# Patient Record
Sex: MALE | Race: WHITE | NOT HISPANIC OR LATINO | URBAN - METROPOLITAN AREA
[De-identification: names, ages, dates, MRNs, and addresses within clinical notes are randomized per-mention and may not be internally consistent; named-entity substitution may affect disease eponyms.]

---

## 2023-01-25 PROBLEM — R74.8 ELEVATED LIVER ENZYMES: Status: ACTIVE | Noted: 2023-01-25

## 2023-01-25 PROBLEM — I20.9 ANGINA, CLASS I (CMS-HCC): Status: ACTIVE | Noted: 2023-01-25

## 2023-01-25 PROBLEM — F10.27: Status: ACTIVE | Noted: 2023-01-25

## 2023-01-25 PROBLEM — I25.10 CORONARY ARTERY STENOSIS: Status: ACTIVE | Noted: 2023-01-25

## 2023-01-25 PROBLEM — R94.6 ABNORMAL THYROID FUNCTION TEST: Status: ACTIVE | Noted: 2023-01-25

## 2023-01-25 PROBLEM — D64.9 ANEMIA: Status: ACTIVE | Noted: 2023-01-25

## 2023-01-25 PROBLEM — R79.89 ABNORMAL LFTS: Status: ACTIVE | Noted: 2023-01-25

## 2023-01-25 PROBLEM — D72.829 ELEVATED WBC COUNT: Status: ACTIVE | Noted: 2023-01-25

## 2023-01-25 PROBLEM — G31.2 ALCOHOLIC ENCEPHALOPATHY (CMS-HCC): Status: ACTIVE | Noted: 2023-01-25

## 2023-01-25 PROBLEM — N18.31 STAGE 3A CHRONIC KIDNEY DISEASE (MULTI): Status: ACTIVE | Noted: 2023-01-25

## 2023-01-25 PROBLEM — I21.9 MYOCARDIAL INFARCTION (MULTI): Status: ACTIVE | Noted: 2023-01-25

## 2023-01-25 PROBLEM — E55.9 VITAMIN D DEFICIENCY: Status: ACTIVE | Noted: 2023-01-25

## 2023-01-25 PROBLEM — F41.9 ANXIETY: Status: ACTIVE | Noted: 2023-01-25

## 2023-01-25 PROBLEM — R73.01 IFG (IMPAIRED FASTING GLUCOSE): Status: ACTIVE | Noted: 2023-01-25

## 2023-01-25 PROBLEM — R76.8 ANA POSITIVE: Status: ACTIVE | Noted: 2023-01-25

## 2023-01-25 PROBLEM — I51.89 DIASTOLIC DYSFUNCTION: Status: ACTIVE | Noted: 2023-01-25

## 2023-01-25 PROBLEM — R31.29 OTHER MICROSCOPIC HEMATURIA: Status: ACTIVE | Noted: 2023-01-25

## 2023-01-25 PROBLEM — E78.5 HYPERLIPIDEMIA: Status: ACTIVE | Noted: 2023-01-25

## 2023-01-25 PROBLEM — R63.4 RAPID WEIGHT LOSS: Status: ACTIVE | Noted: 2023-01-25

## 2023-01-25 PROBLEM — K21.9 GERD (GASTROESOPHAGEAL REFLUX DISEASE): Status: ACTIVE | Noted: 2023-01-25

## 2023-01-25 PROBLEM — K52.9 GASTROENTERITIS: Status: ACTIVE | Noted: 2023-01-25

## 2023-01-25 PROBLEM — F17.210 CIGARETTE SMOKER: Status: ACTIVE | Noted: 2023-01-25

## 2023-01-25 PROBLEM — R60.9 EDEMA: Status: ACTIVE | Noted: 2023-01-25

## 2023-01-25 PROBLEM — F32.9 DEPRESSION, MAJOR: Status: ACTIVE | Noted: 2023-01-25

## 2023-01-25 PROBLEM — J20.9 ACUTE BRONCHITIS: Status: ACTIVE | Noted: 2023-01-25

## 2023-01-25 PROBLEM — S72.001A CLOSED FRACTURE OF RIGHT HIP (MULTI): Status: ACTIVE | Noted: 2023-01-25

## 2023-01-25 PROBLEM — F33.41 RECURRENT MAJOR DEPRESSIVE DISORDER, IN PARTIAL REMISSION (CMS-HCC): Status: ACTIVE | Noted: 2023-01-25

## 2023-01-25 PROBLEM — I30.9 PERICARDITIS, ACUTE (HHS-HCC): Status: ACTIVE | Noted: 2023-01-25

## 2023-01-25 PROBLEM — I10 HYPERTENSION: Status: ACTIVE | Noted: 2023-01-25

## 2023-01-25 PROBLEM — F10.20 ALCOHOL DEPENDENCY (MULTI): Status: ACTIVE | Noted: 2023-01-25

## 2023-01-25 PROBLEM — E87.1 HYPONATREMIA: Status: ACTIVE | Noted: 2023-01-25

## 2023-01-25 PROBLEM — J95.811 PNEUMOTHORAX, IATROGENIC: Status: ACTIVE | Noted: 2023-01-25

## 2023-01-25 PROBLEM — M62.838 NIGHT MUSCLE SPASMS: Status: ACTIVE | Noted: 2023-01-25

## 2023-01-25 PROBLEM — E83.52 HYPERCALCEMIA: Status: ACTIVE | Noted: 2023-01-25

## 2023-01-25 RX ORDER — HYDROXYZINE HYDROCHLORIDE 25 MG/1
1-2 TABLET, FILM COATED ORAL DAILY PRN
COMMUNITY

## 2023-01-25 RX ORDER — ASPIRIN 81 MG/1
1 TABLET ORAL DAILY
COMMUNITY

## 2023-01-25 RX ORDER — ATORVASTATIN CALCIUM 40 MG/1
1.5 TABLET, FILM COATED ORAL
COMMUNITY
Start: 2014-07-31 | End: 2023-04-03 | Stop reason: SDUPTHER

## 2023-03-07 PROBLEM — F32.9 DEPRESSION, MAJOR: Status: RESOLVED | Noted: 2023-01-25 | Resolved: 2023-03-07

## 2023-03-07 PROBLEM — J95.811 PNEUMOTHORAX, IATROGENIC: Status: RESOLVED | Noted: 2023-01-25 | Resolved: 2023-03-07

## 2023-03-07 PROBLEM — R76.8 ANA POSITIVE: Status: RESOLVED | Noted: 2023-01-25 | Resolved: 2023-03-07

## 2023-03-07 PROBLEM — J20.9 ACUTE BRONCHITIS: Status: RESOLVED | Noted: 2023-01-25 | Resolved: 2023-03-07

## 2023-03-07 PROBLEM — E83.52 HYPERCALCEMIA: Status: RESOLVED | Noted: 2023-01-25 | Resolved: 2023-03-07

## 2023-03-07 PROBLEM — I30.9 PERICARDITIS, ACUTE (HHS-HCC): Status: RESOLVED | Noted: 2023-01-25 | Resolved: 2023-03-07

## 2023-03-07 PROBLEM — K52.9 GASTROENTERITIS: Status: RESOLVED | Noted: 2023-01-25 | Resolved: 2023-03-07

## 2023-03-07 PROBLEM — E87.1 HYPONATREMIA: Status: RESOLVED | Noted: 2023-01-25 | Resolved: 2023-03-07

## 2023-03-15 DIAGNOSIS — F51.01 PRIMARY INSOMNIA: Primary | ICD-10-CM

## 2023-03-16 RX ORDER — TRAZODONE HYDROCHLORIDE 50 MG/1
TABLET ORAL
Qty: 30 TABLET | Refills: 0 | Status: SHIPPED | OUTPATIENT
Start: 2023-03-16 | End: 2023-04-03 | Stop reason: SDUPTHER

## 2023-04-03 ENCOUNTER — LAB (OUTPATIENT)
Dept: LAB | Facility: LAB | Age: 63
End: 2023-04-03
Payer: COMMERCIAL

## 2023-04-03 ENCOUNTER — OFFICE VISIT (OUTPATIENT)
Dept: PRIMARY CARE | Facility: CLINIC | Age: 63
End: 2023-04-03
Payer: COMMERCIAL

## 2023-04-03 VITALS
WEIGHT: 149 LBS | BODY MASS INDEX: 20.86 KG/M2 | DIASTOLIC BLOOD PRESSURE: 74 MMHG | SYSTOLIC BLOOD PRESSURE: 126 MMHG | HEART RATE: 102 BPM | RESPIRATION RATE: 16 BRPM | HEIGHT: 71 IN | OXYGEN SATURATION: 99 %

## 2023-04-03 DIAGNOSIS — I10 PRIMARY HYPERTENSION: ICD-10-CM

## 2023-04-03 DIAGNOSIS — F33.41 RECURRENT MAJOR DEPRESSIVE DISORDER, IN PARTIAL REMISSION (CMS-HCC): ICD-10-CM

## 2023-04-03 DIAGNOSIS — I21.9 MYOCARDIAL INFARCTION, UNSPECIFIED MI TYPE, UNSPECIFIED ARTERY (MULTI): ICD-10-CM

## 2023-04-03 DIAGNOSIS — N18.31 STAGE 3A CHRONIC KIDNEY DISEASE (MULTI): ICD-10-CM

## 2023-04-03 DIAGNOSIS — F41.9 ANXIETY: ICD-10-CM

## 2023-04-03 DIAGNOSIS — F10.26: ICD-10-CM

## 2023-04-03 DIAGNOSIS — F51.01 PRIMARY INSOMNIA: ICD-10-CM

## 2023-04-03 DIAGNOSIS — F17.210 CIGARETTE SMOKER: ICD-10-CM

## 2023-04-03 DIAGNOSIS — E78.1 PURE HYPERTRIGLYCERIDEMIA: ICD-10-CM

## 2023-04-03 DIAGNOSIS — R73.01 IFG (IMPAIRED FASTING GLUCOSE): ICD-10-CM

## 2023-04-03 DIAGNOSIS — F10.27 DEMENTIA ASSOCIATED WITH ALCOHOLISM WITH BEHAVIORAL DISTURBANCE (MULTI): Primary | ICD-10-CM

## 2023-04-03 DIAGNOSIS — D72.829 LEUKOCYTOSIS, UNSPECIFIED TYPE: ICD-10-CM

## 2023-04-03 DIAGNOSIS — I25.10 CORONARY ARTERY STENOSIS: ICD-10-CM

## 2023-04-03 PROBLEM — S72.001A CLOSED FRACTURE OF RIGHT HIP (MULTI): Status: RESOLVED | Noted: 2023-01-25 | Resolved: 2023-04-03

## 2023-04-03 PROBLEM — K21.9 GERD (GASTROESOPHAGEAL REFLUX DISEASE): Status: RESOLVED | Noted: 2023-01-25 | Resolved: 2023-04-03

## 2023-04-03 PROBLEM — R79.89 ABNORMAL LFTS: Status: RESOLVED | Noted: 2023-01-25 | Resolved: 2023-04-03

## 2023-04-03 LAB
BASOPHILS (10*3/UL) IN BLOOD BY AUTOMATED COUNT: 0.07 X10E9/L (ref 0–0.1)
BASOPHILS/100 LEUKOCYTES IN BLOOD BY AUTOMATED COUNT: 0.9 % (ref 0–2)
EOSINOPHILS (10*3/UL) IN BLOOD BY AUTOMATED COUNT: 0.22 X10E9/L (ref 0–0.7)
EOSINOPHILS/100 LEUKOCYTES IN BLOOD BY AUTOMATED COUNT: 2.9 % (ref 0–6)
ERYTHROCYTE DISTRIBUTION WIDTH (RATIO) BY AUTOMATED COUNT: 14.8 % (ref 11.5–14.5)
ERYTHROCYTE MEAN CORPUSCULAR HEMOGLOBIN CONCENTRATION (G/DL) BY AUTOMATED: 31.4 G/DL (ref 32–36)
ERYTHROCYTE MEAN CORPUSCULAR VOLUME (FL) BY AUTOMATED COUNT: 94 FL (ref 80–100)
ERYTHROCYTES (10*6/UL) IN BLOOD BY AUTOMATED COUNT: 4.81 X10E12/L (ref 4.5–5.9)
HEMATOCRIT (%) IN BLOOD BY AUTOMATED COUNT: 45.2 % (ref 41–52)
HEMOGLOBIN (G/DL) IN BLOOD: 14.2 G/DL (ref 13.5–17.5)
IMMATURE GRANULOCYTES/100 LEUKOCYTES IN BLOOD BY AUTOMATED COUNT: 0.7 % (ref 0–0.9)
LEUKOCYTES (10*3/UL) IN BLOOD BY AUTOMATED COUNT: 7.5 X10E9/L (ref 4.4–11.3)
LYMPHOCYTES (10*3/UL) IN BLOOD BY AUTOMATED COUNT: 2.13 X10E9/L (ref 1.2–4.8)
LYMPHOCYTES/100 LEUKOCYTES IN BLOOD BY AUTOMATED COUNT: 28.4 % (ref 13–44)
MONOCYTES (10*3/UL) IN BLOOD BY AUTOMATED COUNT: 0.49 X10E9/L (ref 0.1–1)
MONOCYTES/100 LEUKOCYTES IN BLOOD BY AUTOMATED COUNT: 6.5 % (ref 2–10)
NEUTROPHILS (10*3/UL) IN BLOOD BY AUTOMATED COUNT: 4.55 X10E9/L (ref 1.2–7.7)
NEUTROPHILS/100 LEUKOCYTES IN BLOOD BY AUTOMATED COUNT: 60.6 % (ref 40–80)
NRBC (PER 100 WBCS) BY AUTOMATED COUNT: 0 /100 WBC (ref 0–0)
PLATELETS (10*3/UL) IN BLOOD AUTOMATED COUNT: 253 X10E9/L (ref 150–450)

## 2023-04-03 PROCEDURE — 99214 OFFICE O/P EST MOD 30 MIN: CPT | Performed by: FAMILY MEDICINE

## 2023-04-03 PROCEDURE — 85025 COMPLETE CBC W/AUTO DIFF WBC: CPT

## 2023-04-03 PROCEDURE — 3074F SYST BP LT 130 MM HG: CPT | Performed by: FAMILY MEDICINE

## 2023-04-03 PROCEDURE — 3078F DIAST BP <80 MM HG: CPT | Performed by: FAMILY MEDICINE

## 2023-04-03 PROCEDURE — 36415 COLL VENOUS BLD VENIPUNCTURE: CPT

## 2023-04-03 PROCEDURE — 80053 COMPREHEN METABOLIC PANEL: CPT

## 2023-04-03 RX ORDER — TRAZODONE HYDROCHLORIDE 50 MG/1
TABLET ORAL
Qty: 90 TABLET | Refills: 1 | Status: SHIPPED | OUTPATIENT
Start: 2023-04-03 | End: 2023-12-14 | Stop reason: SDUPTHER

## 2023-04-03 RX ORDER — ATORVASTATIN CALCIUM 40 MG/1
60 TABLET, FILM COATED ORAL
Qty: 90 TABLET | Refills: 1 | Status: SHIPPED | OUTPATIENT
Start: 2023-04-03 | End: 2023-07-10 | Stop reason: SDUPTHER

## 2023-04-03 ASSESSMENT — ENCOUNTER SYMPTOMS
DIFFICULTY URINATING: 0
EYE PAIN: 0
FEVER: 0
HEADACHES: 0
SORE THROAT: 0
ARTHRALGIAS: 0
NERVOUS/ANXIOUS: 0
CONSTIPATION: 0
ABDOMINAL PAIN: 0
BRUISES/BLEEDS EASILY: 0
ADENOPATHY: 0
BACK PAIN: 0
CHEST TIGHTNESS: 0
BLOOD IN STOOL: 0
DIARRHEA: 0
DIZZINESS: 0
COUGH: 0
WEAKNESS: 0
ABDOMINAL DISTENTION: 0
APPETITE CHANGE: 0
SHORTNESS OF BREATH: 0
HALLUCINATIONS: 0
EYE REDNESS: 0
CHILLS: 0
DYSPHORIC MOOD: 0
DYSURIA: 0
FATIGUE: 0

## 2023-04-03 NOTE — PROGRESS NOTES
"Subjective   Patient ID: Saturnino Cooper is a 62 y.o. male who presents for Follow-up.    HPI     Review of Systems    Objective   BP (!) 158/96   Pulse 102   Resp 16   Ht 1.803 m (5' 11\")   Wt 67.6 kg (149 lb)   SpO2 99%   BMI 20.78 kg/m²     Physical Exam    Assessment/Plan          "

## 2023-04-03 NOTE — PROGRESS NOTES
"Subjective   Patient ID: Saturnino Cooper is a 62 y.o. male who presents for Follow-up.  Pt has chronic CAD, DLD. Saw Dr Castanon.  Pt is taking ASA, Atorvastatin. Tolerating well.  Exercising 0 days per week   Low sodium diet is not being followed.   Is  monitoring home blood pressures.. Usually good with occasional high.  Denies HA, vision changes or CP.     Dementia / anxiety are stable. He still needs to get appt with psychiatry. Doing much better with anxiety. Getting better sleep 6-8hrs nightly. Unsure if using Hydroxyzine often (brother, Giorgi admins meds and not here today).     He is smoking 1/2 ppd, not ready to quit smoking.  He declines CT for lung cancer screening.           Review of Systems   Constitutional:  Negative for appetite change, chills, fatigue and fever.   HENT:  Negative for congestion, hearing loss and sore throat.    Eyes:  Negative for pain, redness and visual disturbance.   Respiratory:  Negative for cough, chest tightness and shortness of breath.    Cardiovascular:  Negative for chest pain and leg swelling.   Gastrointestinal:  Negative for abdominal distention, abdominal pain, blood in stool, constipation and diarrhea.   Genitourinary:  Negative for difficulty urinating and dysuria.   Musculoskeletal:  Negative for arthralgias and back pain.   Skin:  Negative for rash.   Neurological:  Negative for dizziness, weakness and headaches.   Hematological:  Negative for adenopathy. Does not bruise/bleed easily.   Psychiatric/Behavioral:  Negative for dysphoric mood and hallucinations. The patient is not nervous/anxious.        Objective   /74   Pulse 102   Resp 16   Ht 1.803 m (5' 11\")   Wt 67.6 kg (149 lb)   SpO2 99%   BMI 20.78 kg/m²    Physical Exam  Constitutional:       General: He is not in acute distress.     Appearance: Normal appearance.   Cardiovascular:      Rate and Rhythm: Normal rate and regular rhythm.      Heart sounds: Normal heart sounds. No murmur " heard.  Pulmonary:      Effort: Pulmonary effort is normal.      Breath sounds: Normal breath sounds.   Abdominal:      Palpations: Abdomen is soft.      Tenderness: There is no abdominal tenderness.   Neurological:      Mental Status: He is alert.   Psychiatric:         Mood and Affect: Mood normal.         Judgment: Judgment normal.           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   -     CBC and Auto Differential; Future  -     Comprehensive Metabolic Panel; Future  Myocardial infarction, unspecified MI type, unspecified artery (CMS/HCC)  Stage 3a chronic kidney disease - improved on previous labs, will rechec  IFG (impaired fasting glucose) - Will monitor with labs  Leukocytosis, unspecified type  Alcohol dependence with alcohol-induced persisting amnestic  disorder (CMS/HCC) - doing well reportedly not drinking in months  Anxiety - improved, sleeping better. Continue current meds  Cigarette smoker - Strongly recommend smoking cessation and discussed nicotine replacement and medications to help with quitting smoking.,  Pure hypertriglyceridemia  Recurrent major depressive disorder, in partial remission (CMS/HCC) -continue current meds, plan to est with psychiatry

## 2023-04-04 ENCOUNTER — TELEPHONE (OUTPATIENT)
Dept: PRIMARY CARE | Facility: CLINIC | Age: 63
End: 2023-04-04
Payer: COMMERCIAL

## 2023-04-04 LAB
ALANINE AMINOTRANSFERASE (SGPT) (U/L) IN SER/PLAS: 30 U/L (ref 10–52)
ALBUMIN (G/DL) IN SER/PLAS: 4.7 G/DL (ref 3.4–5)
ALKALINE PHOSPHATASE (U/L) IN SER/PLAS: 72 U/L (ref 33–136)
ANION GAP IN SER/PLAS: 16 MMOL/L (ref 10–20)
ASPARTATE AMINOTRANSFERASE (SGOT) (U/L) IN SER/PLAS: 30 U/L (ref 9–39)
BILIRUBIN TOTAL (MG/DL) IN SER/PLAS: 0.3 MG/DL (ref 0–1.2)
CALCIUM (MG/DL) IN SER/PLAS: 10.4 MG/DL (ref 8.6–10.6)
CARBON DIOXIDE, TOTAL (MMOL/L) IN SER/PLAS: 26 MMOL/L (ref 21–32)
CHLORIDE (MMOL/L) IN SER/PLAS: 103 MMOL/L (ref 98–107)
CREATININE (MG/DL) IN SER/PLAS: 1.29 MG/DL (ref 0.5–1.3)
GFR MALE: 62 ML/MIN/1.73M2
GLUCOSE (MG/DL) IN SER/PLAS: 124 MG/DL (ref 74–99)
POTASSIUM (MMOL/L) IN SER/PLAS: 5.1 MMOL/L (ref 3.5–5.3)
PROTEIN TOTAL: 7.4 G/DL (ref 6.4–8.2)
SODIUM (MMOL/L) IN SER/PLAS: 140 MMOL/L (ref 136–145)
UREA NITROGEN (MG/DL) IN SER/PLAS: 15 MG/DL (ref 6–23)

## 2023-04-04 NOTE — TELEPHONE ENCOUNTER
----- Message from Chente Hicks MD sent at 4/4/2023  7:27 AM EDT -----  FBS elevated in prediabetes range. Other labs look good.   ----- Message -----  From: Lab, Background User  Sent: 4/3/2023  11:59 PM EDT  To: Chente Hicks MD

## 2023-05-18 ENCOUNTER — TELEPHONE (OUTPATIENT)
Dept: PRIMARY CARE | Facility: CLINIC | Age: 63
End: 2023-05-18
Payer: COMMERCIAL

## 2023-05-18 NOTE — TELEPHONE ENCOUNTER
LVM for brother regarding HIPAA form questions. Previous on located in chart but needs to be updated. None seen from previous appt as mentioned by sister. Told pt that form will need to be updated and to contact office with any further questions.

## 2023-07-03 ENCOUNTER — APPOINTMENT (OUTPATIENT)
Dept: PRIMARY CARE | Facility: CLINIC | Age: 63
End: 2023-07-03
Payer: COMMERCIAL

## 2023-07-10 ENCOUNTER — OFFICE VISIT (OUTPATIENT)
Dept: PRIMARY CARE | Facility: CLINIC | Age: 63
End: 2023-07-10
Payer: COMMERCIAL

## 2023-07-10 VITALS
HEART RATE: 92 BPM | RESPIRATION RATE: 16 BRPM | OXYGEN SATURATION: 98 % | HEIGHT: 70 IN | SYSTOLIC BLOOD PRESSURE: 124 MMHG | DIASTOLIC BLOOD PRESSURE: 76 MMHG | WEIGHT: 136 LBS | BODY MASS INDEX: 19.47 KG/M2

## 2023-07-10 DIAGNOSIS — F33.41 RECURRENT MAJOR DEPRESSIVE DISORDER, IN PARTIAL REMISSION (CMS-HCC): ICD-10-CM

## 2023-07-10 DIAGNOSIS — I10 PRIMARY HYPERTENSION: ICD-10-CM

## 2023-07-10 DIAGNOSIS — E78.00 PURE HYPERCHOLESTEROLEMIA: ICD-10-CM

## 2023-07-10 DIAGNOSIS — Z95.1 S/P CABG (CORONARY ARTERY BYPASS GRAFT): ICD-10-CM

## 2023-07-10 DIAGNOSIS — I25.10 CORONARY ARTERY STENOSIS: Primary | ICD-10-CM

## 2023-07-10 DIAGNOSIS — E78.1 PURE HYPERTRIGLYCERIDEMIA: ICD-10-CM

## 2023-07-10 DIAGNOSIS — F17.210 CIGARETTE SMOKER: ICD-10-CM

## 2023-07-10 DIAGNOSIS — I51.89 DIASTOLIC DYSFUNCTION: ICD-10-CM

## 2023-07-10 DIAGNOSIS — R63.4 RAPID WEIGHT LOSS: ICD-10-CM

## 2023-07-10 DIAGNOSIS — F10.27 DEMENTIA ASSOCIATED WITH ALCOHOLISM WITH BEHAVIORAL DISTURBANCE (MULTI): ICD-10-CM

## 2023-07-10 DIAGNOSIS — R73.01 IFG (IMPAIRED FASTING GLUCOSE): ICD-10-CM

## 2023-07-10 PROBLEM — R48.8 OTHER SYMBOLIC DYSFUNCTIONS: Status: ACTIVE | Noted: 2021-07-15

## 2023-07-10 PROBLEM — E44.0 MALNUTRITION OF MODERATE DEGREE (MULTI): Status: ACTIVE | Noted: 2022-10-07

## 2023-07-10 PROBLEM — F10.11 H/O ETOH ABUSE: Status: ACTIVE | Noted: 2021-06-18

## 2023-07-10 PROBLEM — F10.20 ALCOHOL DEPENDENCY (MULTI): Status: RESOLVED | Noted: 2023-01-25 | Resolved: 2023-07-10

## 2023-07-10 PROBLEM — I21.9 MYOCARDIAL INFARCTION (MULTI): Status: RESOLVED | Noted: 2023-01-25 | Resolved: 2023-07-10

## 2023-07-10 PROBLEM — G31.2 ALCOHOLIC ENCEPHALOPATHY (CMS-HCC): Status: RESOLVED | Noted: 2023-01-25 | Resolved: 2023-07-10

## 2023-07-10 PROBLEM — M62.81 MUSCLE WEAKNESS: Status: ACTIVE | Noted: 2021-07-15

## 2023-07-10 PROBLEM — N18.31 STAGE 3A CHRONIC KIDNEY DISEASE (MULTI): Status: RESOLVED | Noted: 2023-01-25 | Resolved: 2023-07-10

## 2023-07-10 PROBLEM — R26.2 DIFFICULTY IN WALKING, NOT ELSEWHERE CLASSIFIED: Status: ACTIVE | Noted: 2021-07-15

## 2023-07-10 PROBLEM — R35.0 URINARY FREQUENCY: Status: ACTIVE | Noted: 2023-07-10

## 2023-07-10 PROBLEM — F03.90 UNSPECIFIED DEMENTIA, UNSPECIFIED SEVERITY, WITHOUT BEHAVIORAL DISTURBANCE, PSYCHOTIC DISTURBANCE, MOOD DISTURBANCE, AND ANXIETY (MULTI): Status: ACTIVE | Noted: 2021-07-15

## 2023-07-10 PROCEDURE — 99214 OFFICE O/P EST MOD 30 MIN: CPT | Performed by: FAMILY MEDICINE

## 2023-07-10 PROCEDURE — 4004F PT TOBACCO SCREEN RCVD TLK: CPT | Performed by: FAMILY MEDICINE

## 2023-07-10 PROCEDURE — 3074F SYST BP LT 130 MM HG: CPT | Performed by: FAMILY MEDICINE

## 2023-07-10 PROCEDURE — 3078F DIAST BP <80 MM HG: CPT | Performed by: FAMILY MEDICINE

## 2023-07-10 RX ORDER — LISINOPRIL 20 MG/1
20 TABLET ORAL DAILY
COMMUNITY
End: 2023-12-14 | Stop reason: WASHOUT

## 2023-07-10 RX ORDER — ATORVASTATIN CALCIUM 40 MG/1
60 TABLET, FILM COATED ORAL
Qty: 90 TABLET | Refills: 1 | Status: SHIPPED | OUTPATIENT
Start: 2023-07-10 | End: 2023-12-14 | Stop reason: SDUPTHER

## 2023-07-10 RX ORDER — MULTIVITAMIN/IRON/FOLIC ACID 18MG-0.4MG
1 TABLET ORAL DAILY
Qty: 90 TABLET | Refills: 3 | Status: SHIPPED | OUTPATIENT
Start: 2023-07-10 | End: 2024-07-09

## 2023-07-10 RX ORDER — METOPROLOL TARTRATE 25 MG/1
25 TABLET, FILM COATED ORAL 2 TIMES DAILY
COMMUNITY
End: 2024-01-11 | Stop reason: SDUPTHER

## 2023-07-10 ASSESSMENT — ENCOUNTER SYMPTOMS
FEVER: 0
NERVOUS/ANXIOUS: 1
EYE PAIN: 0
APPETITE CHANGE: 1
SORE THROAT: 0
CONSTIPATION: 0
DYSURIA: 0
FATIGUE: 0
ABDOMINAL DISTENTION: 0
BLOOD IN STOOL: 0
ABDOMINAL PAIN: 0
SHORTNESS OF BREATH: 0
EYE REDNESS: 0
BACK PAIN: 0
ADENOPATHY: 0
HEADACHES: 0
COUGH: 0
DIZZINESS: 0
BRUISES/BLEEDS EASILY: 0
CHEST TIGHTNESS: 0
WEAKNESS: 0
DYSPHORIC MOOD: 0
ARTHRALGIAS: 0
CHILLS: 0
DIFFICULTY URINATING: 0
DIARRHEA: 0

## 2023-07-10 NOTE — PROGRESS NOTES
"Subjective   Patient ID: Saturnino Cooper is a 62 y.o. male who presents for Follow-up.    HPI     Review of Systems    Objective   /76   Pulse 92   Resp 16   Ht 1.778 m (5' 10\")   Wt 61.7 kg (136 lb)   SpO2 98%   BMI 19.51 kg/m²     Physical Exam    Assessment/Plan          "

## 2023-07-10 NOTE — PROGRESS NOTES
"Subjective   Patient ID: Saturnino Cooper is a 62 y.o. male who presents for Follow-up.  Pt has Dyslipidemia, CAD . Follows with DR Castanon. Started back on Lisinopril, Metroprolol in February.  Lipid panel showed LDL in good range in December.  Currently taking Atorvastatin and is tolerating well without muscle pains or weakness.     Pt has chronic depression / anxiety/ dementia associated with alcoholism. It is improving with starting Sertraline about 1 month ago with psychiatry.  Pt has supportive family/friends.   Pt not seeing a counselor.   Taking Sertraline, Trazodone and it is helping.   Mood, energy, motivation, interests, sleep and appetite are improving. Sleeping 6-8hrs. Anxiety is improving.  Brother here and feels he'd do better if back on Clonzaepam he's taken previously. Discussed that this is possible but his medications will now be managed by psychiatry and should discuss with them.    He has cut back smoking to <1/2 ppd.          Review of Systems   Constitutional:  Positive for appetite change (improving since starting Sertraline). Negative for chills, fatigue and fever.   HENT:  Negative for congestion, hearing loss and sore throat.    Eyes:  Negative for pain, redness and visual disturbance.   Respiratory:  Negative for cough, chest tightness and shortness of breath.    Cardiovascular:  Negative for chest pain and leg swelling.   Gastrointestinal:  Negative for abdominal distention, abdominal pain, blood in stool, constipation and diarrhea.   Genitourinary:  Negative for difficulty urinating and dysuria.   Musculoskeletal:  Negative for arthralgias and back pain.   Skin:  Negative for rash.   Neurological:  Negative for dizziness, weakness and headaches.   Hematological:  Negative for adenopathy. Does not bruise/bleed easily.   Psychiatric/Behavioral:  Negative for dysphoric mood. The patient is nervous/anxious.        Objective   /76   Pulse 92   Resp 16   Ht 1.778 m (5' 10\")   Wt " 61.7 kg (136 lb)   SpO2 98%   BMI 19.51 kg/m²    Physical Exam  Constitutional:       General: He is not in acute distress.     Appearance: Normal appearance.   Cardiovascular:      Rate and Rhythm: Normal rate and regular rhythm.      Heart sounds: Normal heart sounds. No murmur heard.  Pulmonary:      Effort: Pulmonary effort is normal.      Breath sounds: Normal breath sounds.   Abdominal:      Palpations: Abdomen is soft.      Tenderness: There is no abdominal tenderness.   Neurological:      Mental Status: He is alert.   Psychiatric:         Mood and Affect: Mood normal.         Behavior: Behavior normal.           Assessment/Plan   Diagnoses and all orders for this visit:  Coronary artery stenosis/Diastolic dysfunction/Primary hypertension - asymptomatic, doing well on current meds per Dr Castanon  Pure hypertriglyceridemia - Cholesterol - doing well on statin, continue low cholesterol diet and regular exercise. We will continue to monitor with routine labs   S/P CABG (coronary artery bypass graft)  IFG (impaired fasting glucose) - will monitor with labs, discussed avoiding excessive sugar/pop  Rapid weight loss - improving with tx of anxiety, monitor  Dementia associated with alcoholism /Recurrent major depressive disorder, in partial remission (CMS/HCC) - much better with starting Zoloft, continue and discussed keeping fu with Psychiatry.  Cigarette smoker - dong better, keep working on cutting back     Follow up in 6 months, 30min

## 2023-08-25 DIAGNOSIS — F41.9 ANXIETY: Primary | ICD-10-CM

## 2023-08-25 RX ORDER — SERTRALINE HYDROCHLORIDE 25 MG/1
25 TABLET, FILM COATED ORAL DAILY
COMMUNITY
End: 2023-08-25 | Stop reason: SDUPTHER

## 2023-08-25 RX ORDER — SERTRALINE HYDROCHLORIDE 25 MG/1
25 TABLET, FILM COATED ORAL DAILY
Qty: 90 TABLET | Refills: 1 | Status: SHIPPED | OUTPATIENT
Start: 2023-08-25 | End: 2023-12-14 | Stop reason: SDUPTHER

## 2023-09-26 DIAGNOSIS — F51.01 PRIMARY INSOMNIA: ICD-10-CM

## 2023-10-11 ENCOUNTER — TELEPHONE (OUTPATIENT)
Dept: PRIMARY CARE | Facility: CLINIC | Age: 63
End: 2023-10-11
Payer: COMMERCIAL

## 2023-10-13 RX ORDER — TRAZODONE HYDROCHLORIDE 50 MG/1
TABLET ORAL
Qty: 90 TABLET | Refills: 1 | OUTPATIENT
Start: 2023-10-13

## 2023-11-21 ENCOUNTER — PATIENT OUTREACH (OUTPATIENT)
Dept: CARE COORDINATION | Facility: CLINIC | Age: 63
End: 2023-11-21
Payer: COMMERCIAL

## 2023-11-21 ENCOUNTER — DOCUMENTATION (OUTPATIENT)
Dept: CARE COORDINATION | Facility: CLINIC | Age: 63
End: 2023-11-21
Payer: COMMERCIAL

## 2023-11-21 NOTE — PROGRESS NOTES
Discharge Facility: [facility]  Discharge Diagnosis:Hyponatremia  Admission Date:11/15/23  Discharge Date: 11/20/23    PCP Appointment Date:none available sent to pcp office  Specialist Appointment Date:   Hospital Encounter and Summary: Linked   See discharge assessment below for further details  Engagement  Call Start Time: 0943 (11/21/2023  9:43 AM)    Medications  Medications reviewed with patient/caregiver?: Yes (no new meds) (11/21/2023  9:43 AM)  Is the patient having any side effects they believe may be caused by any medication additions or changes?: No (11/21/2023  9:43 AM)  Does the patient have all medications ordered at discharge?: Not applicable (11/21/2023  9:43 AM)  Is the patient taking all medications as directed (includes completed medication regime)?: Yes (11/21/2023  9:43 AM)    Appointments  Does the patient have a primary care provider?: Yes (11/21/2023  9:43 AM)  Care Management Interventions: Advised patient to make appointment (11/21/2023  9:43 AM)    Self Management  Has home health visited the patient within 72 hours of discharge?: Not applicable (11/21/2023  9:43 AM)  Has all Durable Medical Equipment (DME) been delivered?: No (11/21/2023  9:43 AM)    Patient Teaching  Does the patient have access to their discharge instructions?: Yes (11/21/2023  9:43 AM)  Care Management Interventions: Reviewed instructions with patient (11/21/2023  9:43 AM)  What is the patient's perception of their health status since discharge?: Improving (11/21/2023  9:43 AM)    Wrap Up  Call End Time: 1000 (11/21/2023  9:43 AM)

## 2023-11-28 ENCOUNTER — APPOINTMENT (OUTPATIENT)
Dept: PRIMARY CARE | Facility: CLINIC | Age: 63
End: 2023-11-28
Payer: COMMERCIAL

## 2023-11-30 ENCOUNTER — TELEPHONE (OUTPATIENT)
Dept: PRIMARY CARE | Facility: CLINIC | Age: 63
End: 2023-11-30
Payer: COMMERCIAL

## 2023-11-30 NOTE — TELEPHONE ENCOUNTER
Pt sister concerned about pt excessive water consumption, may be related to anxiety or mental problem. H/O hyponatremia and seizure. Would like pt and brother asked about anxiety and/or other medications that may be causing low sodium.

## 2023-12-04 ENCOUNTER — PATIENT OUTREACH (OUTPATIENT)
Dept: CARE COORDINATION | Facility: CLINIC | Age: 63
End: 2023-12-04
Payer: COMMERCIAL

## 2023-12-04 NOTE — PROGRESS NOTES
Unable to reach patient for call back after patient's follow up appointment with PCP.   TERRYM with call back number for patient to call if needed   If no voicemail available call attempts x 2 were made to contact the patient to assist with any questions or concerns patient may have.

## 2023-12-07 ENCOUNTER — TELEPHONE (OUTPATIENT)
Dept: PRIMARY CARE | Facility: CLINIC | Age: 63
End: 2023-12-07
Payer: COMMERCIAL

## 2023-12-07 NOTE — TELEPHONE ENCOUNTER
Pt still awaiting appt for hosp fuv. Brother states he needs ativan or something to calm him down. Explained to brother that he needs to follow up with psychiatry for that medication, or he needs to be seen by PCP for similar meds. Brother very rude and stated he does not need the doctor if he will not prescribe the medication for him and hung up.

## 2023-12-11 ENCOUNTER — TELEPHONE (OUTPATIENT)
Dept: PRIMARY CARE | Facility: CLINIC | Age: 63
End: 2023-12-11
Payer: COMMERCIAL

## 2023-12-13 ENCOUNTER — DOCUMENTATION (OUTPATIENT)
Dept: PRIMARY CARE | Facility: CLINIC | Age: 63
End: 2023-12-13
Payer: COMMERCIAL

## 2023-12-14 ENCOUNTER — OFFICE VISIT (OUTPATIENT)
Dept: PRIMARY CARE | Facility: CLINIC | Age: 63
End: 2023-12-14
Payer: MEDICARE

## 2023-12-14 VITALS
WEIGHT: 149 LBS | OXYGEN SATURATION: 98 % | SYSTOLIC BLOOD PRESSURE: 134 MMHG | DIASTOLIC BLOOD PRESSURE: 74 MMHG | HEIGHT: 70 IN | HEART RATE: 76 BPM | BODY MASS INDEX: 21.33 KG/M2 | RESPIRATION RATE: 12 BRPM

## 2023-12-14 DIAGNOSIS — K21.9 GASTROESOPHAGEAL REFLUX DISEASE, UNSPECIFIED WHETHER ESOPHAGITIS PRESENT: ICD-10-CM

## 2023-12-14 DIAGNOSIS — E78.00 PURE HYPERCHOLESTEROLEMIA: ICD-10-CM

## 2023-12-14 DIAGNOSIS — F54 PSYCHOGENIC POLYDIPSIA: Primary | ICD-10-CM

## 2023-12-14 DIAGNOSIS — E44.0 MALNUTRITION OF MODERATE DEGREE (MULTI): ICD-10-CM

## 2023-12-14 DIAGNOSIS — Z95.1 S/P CABG (CORONARY ARTERY BYPASS GRAFT): ICD-10-CM

## 2023-12-14 DIAGNOSIS — F51.01 PRIMARY INSOMNIA: ICD-10-CM

## 2023-12-14 DIAGNOSIS — I20.9 ANGINA, CLASS I (CMS-HCC): ICD-10-CM

## 2023-12-14 DIAGNOSIS — F10.27 DEMENTIA ASSOCIATED WITH ALCOHOLISM WITH BEHAVIORAL DISTURBANCE (MULTI): ICD-10-CM

## 2023-12-14 DIAGNOSIS — R63.1 PSYCHOGENIC POLYDIPSIA: Primary | ICD-10-CM

## 2023-12-14 DIAGNOSIS — F41.9 ANXIETY: ICD-10-CM

## 2023-12-14 DIAGNOSIS — E78.1 PURE HYPERTRIGLYCERIDEMIA: ICD-10-CM

## 2023-12-14 DIAGNOSIS — F33.41 RECURRENT MAJOR DEPRESSIVE DISORDER, IN PARTIAL REMISSION (CMS-HCC): ICD-10-CM

## 2023-12-14 DIAGNOSIS — I25.10 CORONARY ARTERY STENOSIS: ICD-10-CM

## 2023-12-14 PROBLEM — F03.90 UNSPECIFIED DEMENTIA, UNSPECIFIED SEVERITY, WITHOUT BEHAVIORAL DISTURBANCE, PSYCHOTIC DISTURBANCE, MOOD DISTURBANCE, AND ANXIETY (MULTI): Status: RESOLVED | Noted: 2021-07-15 | Resolved: 2023-12-14

## 2023-12-14 PROCEDURE — 99214 OFFICE O/P EST MOD 30 MIN: CPT | Performed by: FAMILY MEDICINE

## 2023-12-14 PROCEDURE — 1126F AMNT PAIN NOTED NONE PRSNT: CPT | Performed by: FAMILY MEDICINE

## 2023-12-14 PROCEDURE — 4004F PT TOBACCO SCREEN RCVD TLK: CPT | Performed by: FAMILY MEDICINE

## 2023-12-14 PROCEDURE — 1159F MED LIST DOCD IN RCRD: CPT | Performed by: FAMILY MEDICINE

## 2023-12-14 PROCEDURE — 3078F DIAST BP <80 MM HG: CPT | Performed by: FAMILY MEDICINE

## 2023-12-14 PROCEDURE — 3077F SYST BP >= 140 MM HG: CPT | Performed by: FAMILY MEDICINE

## 2023-12-14 PROCEDURE — 3080F DIAST BP >= 90 MM HG: CPT | Performed by: FAMILY MEDICINE

## 2023-12-14 PROCEDURE — 3075F SYST BP GE 130 - 139MM HG: CPT | Performed by: FAMILY MEDICINE

## 2023-12-14 RX ORDER — PANTOPRAZOLE SODIUM 40 MG/1
40 TABLET, DELAYED RELEASE ORAL
Qty: 90 TABLET | Refills: 0 | Status: SHIPPED | OUTPATIENT
Start: 2023-12-14 | End: 2024-02-12

## 2023-12-14 RX ORDER — SERTRALINE HYDROCHLORIDE 25 MG/1
25 TABLET, FILM COATED ORAL DAILY
Qty: 30 TABLET | Refills: 0 | Status: SHIPPED | OUTPATIENT
Start: 2023-12-14 | End: 2024-01-11 | Stop reason: SDUPTHER

## 2023-12-14 RX ORDER — TRAZODONE HYDROCHLORIDE 50 MG/1
TABLET ORAL
Qty: 30 TABLET | Refills: 0 | Status: SHIPPED | OUTPATIENT
Start: 2023-12-14 | End: 2024-01-11 | Stop reason: SDUPTHER

## 2023-12-14 RX ORDER — ATORVASTATIN CALCIUM 40 MG/1
60 TABLET, FILM COATED ORAL
Qty: 90 TABLET | Refills: 1 | Status: SHIPPED | OUTPATIENT
Start: 2023-12-14 | End: 2024-05-20 | Stop reason: SDUPTHER

## 2023-12-14 ASSESSMENT — ENCOUNTER SYMPTOMS
BLOOD IN STOOL: 0
CHILLS: 0
DYSPHORIC MOOD: 0
APPETITE CHANGE: 0
WEAKNESS: 0
ABDOMINAL DISTENTION: 0
ARTHRALGIAS: 0
BRUISES/BLEEDS EASILY: 0
DYSURIA: 0
EYE PAIN: 0
ABDOMINAL PAIN: 0
SORE THROAT: 0
CHEST TIGHTNESS: 0
CONSTIPATION: 0
HEADACHES: 0
BACK PAIN: 0
DIARRHEA: 0
SHORTNESS OF BREATH: 0
FEVER: 0
ADENOPATHY: 0
COUGH: 0
DIZZINESS: 0
NERVOUS/ANXIOUS: 0
FATIGUE: 0
DIFFICULTY URINATING: 0
EYE REDNESS: 0

## 2023-12-14 NOTE — PROGRESS NOTES
"Subjective   Patient ID: Saturnino Cooper is a 63 y.o. male who presents for Hospital Follow-up.  Pt here for fu from hospitalization at [facility] 11/15-20 . He was in with nausea and vomiting. He was drinking excessive amount of water again and was found to be hyponatremia, in ARF, with elevated WBC and hypoosmolality. He had a single seizure with inpt. His ASA, Pantoprazole, Remeron, Cymbalta and B1 were discontinued. He restarted ASA and B1 since home. Over the past 3 days he has not been eating. He has made suicidal statements to brother, Giorgi. He has not attempted hurting himself but it does worry his brother. He needs to schedule follow up with psychiatry          Review of Systems   Constitutional:  Negative for appetite change, chills, fatigue and fever.   HENT:  Negative for congestion, hearing loss and sore throat.    Eyes:  Negative for pain, redness and visual disturbance.   Respiratory:  Negative for cough, chest tightness and shortness of breath.    Cardiovascular:  Negative for chest pain and leg swelling.   Gastrointestinal:  Negative for abdominal distention, abdominal pain, blood in stool, constipation and diarrhea.   Genitourinary:  Negative for difficulty urinating and dysuria.   Musculoskeletal:  Negative for arthralgias and back pain.   Skin:  Negative for rash.   Neurological:  Negative for dizziness, weakness and headaches.   Hematological:  Negative for adenopathy. Does not bruise/bleed easily.   Psychiatric/Behavioral:  Negative for dysphoric mood. The patient is not nervous/anxious.        Objective   /74   Pulse 76   Resp 12   Ht 1.778 m (5' 10\")   Wt 67.6 kg (149 lb)   SpO2 98%   BMI 21.38 kg/m²    Physical Exam  Constitutional:       General: He is not in acute distress.     Appearance: Normal appearance.   Cardiovascular:      Rate and Rhythm: Normal rate and regular rhythm.      Heart sounds: Normal heart sounds. No murmur heard.  Pulmonary:      Effort: Pulmonary effort is " normal.      Breath sounds: Normal breath sounds.   Abdominal:      Palpations: Abdomen is soft.      Tenderness: There is no abdominal tenderness.   Neurological:      Mental Status: He is alert.   Psychiatric:         Mood and Affect: Mood normal.      Comments: Anxious, pleasantly confused           Assessment/Plan   Diagnoses and all orders for this visit:  Psychogenic polydipsia - stable, continue to monitor water intake with goal to keep under 1 gallon per day  Dementia /depressive disorder/  Anxiety- discussed that if having suicidal thoughts to go to ER/ [facility] ( directions given). Otherwise continue current meds and schedule follow up with psychiatrist.  Coronary artery stenosis/Pure hypercholesterolemia - stable continue to follow withDr Lg  S/P CABG (coronary artery bypass graft)  Malnutrition of moderate degree - encourage high calorie/modest protein diet.  Smoking - will try to limit    Follow up in 3months, 30min

## 2023-12-14 NOTE — PROGRESS NOTES
"Subjective   Patient ID: Saturnino Cooper is a 63 y.o. male who presents for Hospital Follow-up.    HPI     Review of Systems    Objective   BP (!) 168/92   Pulse 76   Resp 12   Ht 1.778 m (5' 10\")   Wt 67.6 kg (149 lb)   SpO2 98%   BMI 21.38 kg/m²     Physical Exam    Assessment/Plan          "

## 2023-12-19 ENCOUNTER — PATIENT OUTREACH (OUTPATIENT)
Dept: CARE COORDINATION | Facility: CLINIC | Age: 63
End: 2023-12-19
Payer: MEDICARE

## 2024-01-11 ENCOUNTER — OFFICE VISIT (OUTPATIENT)
Dept: PRIMARY CARE | Facility: CLINIC | Age: 64
End: 2024-01-11
Payer: MEDICARE

## 2024-01-11 VITALS
SYSTOLIC BLOOD PRESSURE: 142 MMHG | BODY MASS INDEX: 21.05 KG/M2 | DIASTOLIC BLOOD PRESSURE: 84 MMHG | OXYGEN SATURATION: 99 % | HEIGHT: 70 IN | HEART RATE: 64 BPM | RESPIRATION RATE: 12 BRPM | WEIGHT: 147 LBS

## 2024-01-11 DIAGNOSIS — R63.1 PSYCHOGENIC POLYDIPSIA: ICD-10-CM

## 2024-01-11 DIAGNOSIS — I10 PRIMARY HYPERTENSION: ICD-10-CM

## 2024-01-11 DIAGNOSIS — F41.9 ANXIETY: ICD-10-CM

## 2024-01-11 DIAGNOSIS — F54 PSYCHOGENIC POLYDIPSIA: ICD-10-CM

## 2024-01-11 DIAGNOSIS — F10.27 DEMENTIA ASSOCIATED WITH ALCOHOLISM WITH BEHAVIORAL DISTURBANCE (MULTI): ICD-10-CM

## 2024-01-11 DIAGNOSIS — F51.01 PRIMARY INSOMNIA: ICD-10-CM

## 2024-01-11 DIAGNOSIS — M79.675 GREAT TOE PAIN, LEFT: ICD-10-CM

## 2024-01-11 DIAGNOSIS — Z00.00 ROUTINE GENERAL MEDICAL EXAMINATION AT A HEALTH CARE FACILITY: Primary | ICD-10-CM

## 2024-01-11 DIAGNOSIS — Z12.5 SCREENING FOR PROSTATE CANCER: ICD-10-CM

## 2024-01-11 DIAGNOSIS — F17.210 CIGARETTE SMOKER: ICD-10-CM

## 2024-01-11 DIAGNOSIS — E78.5 DYSLIPIDEMIA: ICD-10-CM

## 2024-01-11 DIAGNOSIS — Z12.11 ENCOUNTER FOR SCREENING FOR MALIGNANT NEOPLASM OF COLON: ICD-10-CM

## 2024-01-11 PROCEDURE — 4004F PT TOBACCO SCREEN RCVD TLK: CPT | Performed by: FAMILY MEDICINE

## 2024-01-11 PROCEDURE — 90662 IIV NO PRSV INCREASED AG IM: CPT | Performed by: FAMILY MEDICINE

## 2024-01-11 PROCEDURE — G0009 ADMIN PNEUMOCOCCAL VACCINE: HCPCS | Performed by: FAMILY MEDICINE

## 2024-01-11 PROCEDURE — 1170F FXNL STATUS ASSESSED: CPT | Performed by: FAMILY MEDICINE

## 2024-01-11 PROCEDURE — 1126F AMNT PAIN NOTED NONE PRSNT: CPT | Performed by: FAMILY MEDICINE

## 2024-01-11 PROCEDURE — 3077F SYST BP >= 140 MM HG: CPT | Performed by: FAMILY MEDICINE

## 2024-01-11 PROCEDURE — G0402 INITIAL PREVENTIVE EXAM: HCPCS | Performed by: FAMILY MEDICINE

## 2024-01-11 PROCEDURE — 3079F DIAST BP 80-89 MM HG: CPT | Performed by: FAMILY MEDICINE

## 2024-01-11 PROCEDURE — 1159F MED LIST DOCD IN RCRD: CPT | Performed by: FAMILY MEDICINE

## 2024-01-11 PROCEDURE — 90677 PCV20 VACCINE IM: CPT | Performed by: FAMILY MEDICINE

## 2024-01-11 PROCEDURE — G0008 ADMIN INFLUENZA VIRUS VAC: HCPCS | Performed by: FAMILY MEDICINE

## 2024-01-11 RX ORDER — SERTRALINE HYDROCHLORIDE 25 MG/1
25 TABLET, FILM COATED ORAL DAILY
Qty: 30 TABLET | Refills: 0 | Status: SHIPPED | OUTPATIENT
Start: 2024-01-11 | End: 2024-01-26 | Stop reason: SDUPTHER

## 2024-01-11 RX ORDER — METOPROLOL TARTRATE 25 MG/1
25 TABLET, FILM COATED ORAL 2 TIMES DAILY
Qty: 180 TABLET | Refills: 0 | Status: SHIPPED | OUTPATIENT
Start: 2024-01-11 | End: 2024-05-20 | Stop reason: SDUPTHER

## 2024-01-11 RX ORDER — SERTRALINE HYDROCHLORIDE 25 MG/1
25 TABLET, FILM COATED ORAL DAILY
Qty: 30 TABLET | Refills: 0 | OUTPATIENT
Start: 2024-01-11

## 2024-01-11 RX ORDER — TRAZODONE HYDROCHLORIDE 50 MG/1
TABLET ORAL
Qty: 30 TABLET | Refills: 0 | Status: SHIPPED | OUTPATIENT
Start: 2024-01-11 | End: 2024-01-26 | Stop reason: WASHOUT

## 2024-01-11 RX ORDER — TRAZODONE HYDROCHLORIDE 50 MG/1
TABLET ORAL
Qty: 30 TABLET | Refills: 0 | OUTPATIENT
Start: 2024-01-11

## 2024-01-11 ASSESSMENT — ENCOUNTER SYMPTOMS
SHORTNESS OF BREATH: 0
BACK PAIN: 0
ADENOPATHY: 0
DIFFICULTY URINATING: 0
DYSPHORIC MOOD: 0
FATIGUE: 0
DYSURIA: 0
CHEST TIGHTNESS: 0
EYE PAIN: 0
BLOOD IN STOOL: 0
DIARRHEA: 0
DEPRESSION: 0
CONSTIPATION: 0
COUGH: 0
EYE REDNESS: 0
ABDOMINAL PAIN: 0
BRUISES/BLEEDS EASILY: 0
FEVER: 0
ARTHRALGIAS: 0
CHILLS: 0
HEADACHES: 0
ABDOMINAL DISTENTION: 0
WEAKNESS: 0
DIZZINESS: 0
NERVOUS/ANXIOUS: 0
LOSS OF SENSATION IN FEET: 0
OCCASIONAL FEELINGS OF UNSTEADINESS: 0
SORE THROAT: 0
APPETITE CHANGE: 0

## 2024-01-11 ASSESSMENT — PATIENT HEALTH QUESTIONNAIRE - PHQ9
2. FEELING DOWN, DEPRESSED OR HOPELESS: NOT AT ALL
1. LITTLE INTEREST OR PLEASURE IN DOING THINGS: NOT AT ALL
SUM OF ALL RESPONSES TO PHQ9 QUESTIONS 1 AND 2: 0

## 2024-01-11 ASSESSMENT — ACTIVITIES OF DAILY LIVING (ADL)
GROCERY_SHOPPING: NEEDS ASSISTANCE
DOING_HOUSEWORK: NEEDS ASSISTANCE
DRESSING: INDEPENDENT
TAKING_MEDICATION: NEEDS ASSISTANCE
BATHING: INDEPENDENT
MANAGING_FINANCES: TOTAL CARE

## 2024-01-11 NOTE — PROGRESS NOTES
Subjective   Patient ID: Saturnino Cooper is a 63 y.o. male who presents for Medicare Annual Wellness Visit Initial.  PMHX, PSHx, Fam hx, and Social hx reviewed.   New concerns - they went to [facility] last month, not admitted and advised to [facility] for admission but the declined.   Anxiety is a little better, and drinking less water, but still having bothersome symptoms.  Vaccines Flu, Prevnar 20  Dentist seen at least yearly no  Vision concerns none  Hearing concerns none  Diet is not overall healthy.   Smoker - 1/2 ppd, not ready to quit smoking  Alcohol use - none  Exercising 0 days per week.   Cologuard screen         Medicare Wellness Billing Compliance Satisfied    *This is a visual tool to show completion of required items on the day of the visit. Green checks will only appear on the date of visit.    Review all medications by prescribing practitioner or clinical pharmacist (such as prescriptions, OTCs, herbal therapies and supplements) documented in the medical record    Past Medical, Surgical, and Family History reviewed and updated in chart    Tobacco Use Reviewed    Alcohol Use Reviewed    Illicit Drug Use Reviewed    PHQ2/9    Falls in Last Year Reviewed    Home Safety Risk Factors Reviewed    Cognitive Impairment Reviewed    Patient Self Assessment and Health Status    Current Diet Reviewed    Exercise Frequency    ADL - Hearing Impairment    ADL - Bathing    ADL - Dressing    ADL - Walks in Home    IADL - Managing Finances    IADL - Grocery Shopping    IADL - Taking Medications    IADL - Doing Housework      Review of Systems   Constitutional:  Negative for appetite change, chills, fatigue and fever.   HENT:  Negative for congestion, hearing loss and sore throat.    Eyes:  Negative for pain, redness and visual disturbance.   Respiratory:  Negative for cough, chest tightness and shortness of breath.    Cardiovascular:  Negative for chest pain and leg swelling.  "  Gastrointestinal:  Negative for abdominal distention, abdominal pain, blood in stool, constipation and diarrhea.   Genitourinary:  Negative for difficulty urinating and dysuria.   Musculoskeletal:  Negative for arthralgias and back pain.   Skin:  Negative for rash.   Neurological:  Negative for dizziness, weakness and headaches.   Hematological:  Negative for adenopathy. Does not bruise/bleed easily.   Psychiatric/Behavioral:  Negative for dysphoric mood. The patient is not nervous/anxious.        Objective   /84   Pulse 64   Resp 12   Ht 1.778 m (5' 10\")   Wt 66.7 kg (147 lb)   SpO2 99%   BMI 21.09 kg/m²    Physical Exam  Constitutional:       General: He is not in acute distress.     Appearance: Normal appearance. He is not ill-appearing.   HENT:      Head: Normocephalic and atraumatic.      Right Ear: Tympanic membrane, ear canal and external ear normal.      Left Ear: Tympanic membrane, ear canal and external ear normal.      Nose: Nose normal.      Mouth/Throat:      Mouth: Mucous membranes are moist.      Pharynx: No oropharyngeal exudate or posterior oropharyngeal erythema.   Eyes:      Extraocular Movements: Extraocular movements intact.      Conjunctiva/sclera: Conjunctivae normal.      Pupils: Pupils are equal, round, and reactive to light.   Neck:      Vascular: No carotid bruit.   Cardiovascular:      Rate and Rhythm: Normal rate and regular rhythm.      Heart sounds: Normal heart sounds. No murmur heard.  Pulmonary:      Breath sounds: No wheezing, rhonchi or rales.      Comments: Decreased breath sounds globally  Abdominal:      General: Bowel sounds are normal. There is no distension.      Palpations: Abdomen is soft. There is no mass.      Tenderness: There is no abdominal tenderness.   Genitourinary:     Comments: Pt declines PILLO  Musculoskeletal:         General: No swelling or deformity.      Cervical back: Neck supple. No tenderness.   Lymphadenopathy:      Cervical: No cervical " adenopathy.   Skin:     General: Skin is warm and dry.      Findings: No lesion or rash.   Neurological:      Mental Status: He is alert and oriented to person, place, and time.      Sensory: No sensory deficit.      Motor: No weakness.      Coordination: Coordination normal.      Deep Tendon Reflexes: Reflexes normal.   Psychiatric:         Mood and Affect: Mood normal.         Behavior: Behavior normal.         Judgment: Judgment normal.           Assessment/Plan   Diagnoses and all orders for this visit:  Routine general medical examination  - Flu and Prevnar 20 shots given. Recheck fasting labs. Cologuard ordered. Ordered CT for lung screening. .  Anxiety/Dementia associated with alcoholism with behavioral disturbance (CMS/HCC) - keep plan to see Geriatric psychiatry  Cigarette smoker  recommend smoking cessation and discussed nicotine replacement and medications to help with quitting smoking.   L toe pain/deformity - referring to Podiatry  Follow up in 3months, 30min

## 2024-01-11 NOTE — PROGRESS NOTES
"Subjective   Reason for Visit: Saturnino Cooper is an 63 y.o. male here for a Medicare Wellness visit.     Past Medical, Surgical, and Family History reviewed and updated in chart.    Reviewed all medications by prescribing practitioner or clinical pharmacist (such as prescriptions, OTCs, herbal therapies and supplements) and documented in the medical record.    HPI    Patient Care Team:  Chente Hicks MD as PCP - General  Sherri Millard LPN as Care Manager (Case Management)     Review of Systems    Objective   Vitals:  /84   Pulse 64   Resp 12   Ht 1.778 m (5' 10\")   Wt 66.7 kg (147 lb)   SpO2 99%   BMI 21.09 kg/m²       Physical Exam    Assessment/Plan   Problem List Items Addressed This Visit    None         "

## 2024-01-26 ENCOUNTER — TELEMEDICINE (OUTPATIENT)
Dept: BEHAVIORAL HEALTH | Facility: CLINIC | Age: 64
End: 2024-01-26
Payer: MEDICARE

## 2024-01-26 DIAGNOSIS — F41.9 ANXIETY: ICD-10-CM

## 2024-01-26 DIAGNOSIS — F31.9 BIPOLAR AND RELATED DISORDER (MULTI): ICD-10-CM

## 2024-01-26 DIAGNOSIS — F54 PSYCHOGENIC POLYDIPSIA: ICD-10-CM

## 2024-01-26 DIAGNOSIS — F32.A ANXIETY AND DEPRESSION: ICD-10-CM

## 2024-01-26 DIAGNOSIS — R63.1 PSYCHOGENIC POLYDIPSIA: ICD-10-CM

## 2024-01-26 DIAGNOSIS — F10.27 DEMENTIA ASSOCIATED WITH ALCOHOLISM WITH BEHAVIORAL DISTURBANCE (MULTI): ICD-10-CM

## 2024-01-26 DIAGNOSIS — F41.9 ANXIETY AND DEPRESSION: ICD-10-CM

## 2024-01-26 PROCEDURE — 99214 OFFICE O/P EST MOD 30 MIN: CPT | Performed by: PSYCHIATRY & NEUROLOGY

## 2024-01-26 RX ORDER — QUETIAPINE FUMARATE 25 MG/1
25 TABLET, FILM COATED ORAL NIGHTLY
Qty: 30 TABLET | Refills: 2 | Status: SHIPPED | OUTPATIENT
Start: 2024-01-26 | End: 2024-02-09 | Stop reason: SDUPTHER

## 2024-01-26 RX ORDER — SERTRALINE HYDROCHLORIDE 50 MG/1
50 TABLET, FILM COATED ORAL DAILY
Qty: 30 TABLET | Refills: 2 | Status: SHIPPED | OUTPATIENT
Start: 2024-01-26 | End: 2024-02-09 | Stop reason: SDUPTHER

## 2024-01-26 SDOH — ECONOMIC STABILITY: TRANSPORTATION INSECURITY
IN THE PAST 12 MONTHS, HAS THE LACK OF TRANSPORTATION KEPT YOU FROM MEDICAL APPOINTMENTS OR FROM GETTING MEDICATIONS?: NO

## 2024-01-26 SDOH — ECONOMIC STABILITY: INCOME INSECURITY: IN THE LAST 12 MONTHS, WAS THERE A TIME WHEN YOU WERE NOT ABLE TO PAY THE MORTGAGE OR RENT ON TIME?: NO

## 2024-01-26 SDOH — ECONOMIC STABILITY: FOOD INSECURITY: WITHIN THE PAST 12 MONTHS, YOU WORRIED THAT YOUR FOOD WOULD RUN OUT BEFORE YOU GOT MONEY TO BUY MORE.: NEVER TRUE

## 2024-01-26 SDOH — ECONOMIC STABILITY: HOUSING INSECURITY
IN THE LAST 12 MONTHS, WAS THERE A TIME WHEN YOU DID NOT HAVE A STEADY PLACE TO SLEEP OR SLEPT IN A SHELTER (INCLUDING NOW)?: NO

## 2024-01-26 SDOH — ECONOMIC STABILITY: HOUSING INSECURITY: IN THE LAST 12 MONTHS, HOW MANY PLACES HAVE YOU LIVED?: 1

## 2024-01-26 SDOH — ECONOMIC STABILITY: FOOD INSECURITY: WITHIN THE PAST 12 MONTHS, THE FOOD YOU BOUGHT JUST DIDN'T LAST AND YOU DIDN'T HAVE MONEY TO GET MORE.: NEVER TRUE

## 2024-01-26 SDOH — ECONOMIC STABILITY: GENERAL
WHICH OF THE FOLLOWING WOULD YOU LIKE TO GET CONNECTED TO IN ORDER TO RECEIVE A DISCOUNT OR FOR FREE? (CHOOSE ALL THAT APPLY): NONE OF THESE

## 2024-01-26 SDOH — HEALTH STABILITY: PHYSICAL HEALTH: ON AVERAGE, HOW MANY MINUTES DO YOU ENGAGE IN EXERCISE AT THIS LEVEL?: 20 MIN

## 2024-01-26 SDOH — ECONOMIC STABILITY: TRANSPORTATION INSECURITY
IN THE PAST 12 MONTHS, HAS LACK OF TRANSPORTATION KEPT YOU FROM MEETINGS, WORK, OR FROM GETTING THINGS NEEDED FOR DAILY LIVING?: NO

## 2024-01-26 SDOH — HEALTH STABILITY: PHYSICAL HEALTH: ON AVERAGE, HOW MANY DAYS PER WEEK DO YOU ENGAGE IN MODERATE TO STRENUOUS EXERCISE (LIKE A BRISK WALK)?: 7 DAYS

## 2024-01-26 SDOH — ECONOMIC STABILITY: GENERAL
WHICH OF THE FOLLOWING DO YOU KNOW HOW TO USE AND HAVE ACCESS TO EVERY DAY? (CHOOSE ALL THAT APPLY): DESKTOP COMPUTER, LAPTOP COMPUTER, OR TABLET WITH BROADBAND INTERNET CONNECTION;SMARTPHONE WITH CELLULAR DATA PLAN

## 2024-01-26 ASSESSMENT — ENCOUNTER SYMPTOMS
DYSPHORIC MOOD: 1
NERVOUS/ANXIOUS: 1
SLEEP DISTURBANCE: 1

## 2024-01-26 ASSESSMENT — SOCIAL DETERMINANTS OF HEALTH (SDOH)
WITHIN THE LAST YEAR, HAVE YOU BEEN AFRAID OF YOUR PARTNER OR EX-PARTNER?: NO
WITHIN THE LAST YEAR, HAVE YOU BEEN HUMILIATED OR EMOTIONALLY ABUSED IN OTHER WAYS BY YOUR PARTNER OR EX-PARTNER?: NO
WITHIN THE LAST YEAR, HAVE TO BEEN RAPED OR FORCED TO HAVE ANY KIND OF SEXUAL ACTIVITY BY YOUR PARTNER OR EX-PARTNER?: NO
HOW HARD IS IT FOR YOU TO PAY FOR THE VERY BASICS LIKE FOOD, HOUSING, MEDICAL CARE, AND HEATING?: NOT HARD AT ALL
IN THE PAST 12 MONTHS, HAS THE ELECTRIC, GAS, OIL, OR WATER COMPANY THREATENED TO SHUT OFF SERVICE IN YOUR HOME?: NO
DO YOU BELONG TO ANY CLUBS OR ORGANIZATIONS SUCH AS CHURCH GROUPS UNIONS, FRATERNAL OR ATHLETIC GROUPS, OR SCHOOL GROUPS?: NO
IN A TYPICAL WEEK, HOW MANY TIMES DO YOU TALK ON THE PHONE WITH FAMILY, FRIENDS, OR NEIGHBORS?: MORE THAN THREE TIMES A WEEK
HOW OFTEN DO YOU ATTEND CHURCH OR RELIGIOUS SERVICES?: 1 TO 4 TIMES PER YEAR
HOW OFTEN DO YOU ATTENT MEETINGS OF THE CLUB OR ORGANIZATION YOU BELONG TO?: 1 TO 4 TIMES PER YEAR
HOW OFTEN DO YOU GET TOGETHER WITH FRIENDS OR RELATIVES?: MORE THAN THREE TIMES A WEEK
WITHIN THE LAST YEAR, HAVE YOU BEEN KICKED, HIT, SLAPPED, OR OTHERWISE PHYSICALLY HURT BY YOUR PARTNER OR EX-PARTNER?: NO

## 2024-01-26 ASSESSMENT — LIFESTYLE VARIABLES
AUDIT-C TOTAL SCORE: 0
HOW OFTEN DO YOU HAVE A DRINK CONTAINING ALCOHOL: NEVER
SKIP TO QUESTIONS 9-10: 1
HOW OFTEN DO YOU HAVE SIX OR MORE DRINKS ON ONE OCCASION: NEVER
HOW MANY STANDARD DRINKS CONTAINING ALCOHOL DO YOU HAVE ON A TYPICAL DAY: PATIENT DOES NOT DRINK

## 2024-01-26 NOTE — PROGRESS NOTES
Subjective   Patient ID: Saturnino Cooper is a 63 y.o. male who presents for No chief complaint on file..    The patient is alert, fully oriented, language is intact, and recent and remote memory intact. The patient denies any suicidal or homicidal ideation or plans. The patient presents with mixed anxious, panic and depressive features with poor sleep and no clear psychotic symptoms. Thought is impoverished. Judgment and insight are significantly impaired. The patient can't explain his chronic high anxiety and dysphoria.       Review of Systems   Neurological:         The patient is alert, fully oriented, language is intact, and recent and remote memory intact. The patient denies any suicidal or homicidal ideation or plans. The patient presents with mixed anxious, panic and depressive features with poor sleep and no clear psychotic symptoms. Thought is impoverished. Judgment and insight are significantly impaired. The patient can't explain his chronic high anxiety and dysphoria.      Psychiatric/Behavioral:  Positive for behavioral problems, dysphoric mood and sleep disturbance. The patient is nervous/anxious.         The patient is alert, fully oriented, language is intact, and recent and remote memory intact. The patient denies any suicidal or homicidal ideation or plans. The patient presents with mixed anxious, panic and depressive features with poor sleep and no clear psychotic symptoms. Thought is impoverished. Judgment and insight are significantly impaired. The patient can't explain his chronic high anxiety and dysphoria.      All other systems reviewed and are negative.      Objective   Physical Exam  Neurological:      General: No focal deficit present.      Mental Status: He is alert and oriented to person, place, and time.      Comments: The patient is alert, fully oriented, language is intact, and recent and remote memory intact. The patient denies any suicidal or homicidal ideation or plans. The  patient presents with mixed anxious, panic and depressive features with poor sleep and no clear psychotic symptoms. Thought is impoverished. Judgment and insight are significantly impaired. The patient can't explain his chronic high anxiety and dysphoria.      Psychiatric:      Comments: The patient is alert, fully oriented, language is intact, and recent and remote memory intact. The patient denies any suicidal or homicidal ideation or plans. The patient presents with mixed anxious, panic and depressive features with poor sleep and no clear psychotic symptoms. Thought is impoverished. Judgment and insight are significantly impaired. The patient can't explain his chronic high anxiety and dysphoria.   We discussed the differential between unipolar mood disorder versus a bipolar mood disorder with the patient and his caregiver and live in brother.          Lab Review:       Assessment/Plan   The FDA risks, benefits & alternatives to the medications prescribed were explained to you and your support person, your brother who is you main caregiver and who lives with you, today. You & your brother were able to understand & repeat these risks, benefits & alternatives to these prescribed medications. You and your  brother have agreed to proceed with treatment with the medications discussed based on the conclusion that the benefit outweighs the risks of this treatment regimen: increase sertraline to 50 mg daily with food, stop trazodone nightly and replace with quetiapine 25 mg nightly. Follow up in 2 to 3 weeks.

## 2024-01-28 ENCOUNTER — TELEPHONE (OUTPATIENT)
Dept: PHARMACY | Facility: HOSPITAL | Age: 64
End: 2024-01-28
Payer: MEDICARE

## 2024-01-28 NOTE — TELEPHONE ENCOUNTER
Population Health: Outreach by Ambulatory Pharmacy Team    Payor: Maneul MURRAY  Reason: Adherence  Medication: Lisinopril 20 mg stopped by the PCP as per the patient due to Sodium levels and will review once labs are done  Outcome: Patient Reached: Claims Adherence, Waiting on results of the lab work and PCP recommendations    Yessy Courtney, PharmD

## 2024-01-29 ENCOUNTER — HOSPITAL ENCOUNTER (OUTPATIENT)
Dept: RADIOLOGY | Facility: CLINIC | Age: 64
Discharge: HOME | End: 2024-01-29
Payer: MEDICARE

## 2024-01-29 ENCOUNTER — LAB (OUTPATIENT)
Dept: LAB | Facility: LAB | Age: 64
End: 2024-01-29
Payer: MEDICARE

## 2024-01-29 DIAGNOSIS — F17.210 CIGARETTE SMOKER: ICD-10-CM

## 2024-01-29 DIAGNOSIS — E78.5 DYSLIPIDEMIA: ICD-10-CM

## 2024-01-29 DIAGNOSIS — Z12.5 SCREENING FOR PROSTATE CANCER: ICD-10-CM

## 2024-01-29 LAB
CHOLEST SERPL-MCNC: 172 MG/DL (ref 0–199)
CHOLESTEROL/HDL RATIO: 3
HDLC SERPL-MCNC: 58.3 MG/DL
LDLC SERPL CALC-MCNC: 104 MG/DL
NON HDL CHOLESTEROL: 114 MG/DL (ref 0–149)
PSA SERPL-MCNC: 1.81 NG/ML
TRIGL SERPL-MCNC: 49 MG/DL (ref 0–149)
VLDL: 10 MG/DL (ref 0–40)

## 2024-01-29 PROCEDURE — G0103 PSA SCREENING: HCPCS

## 2024-01-29 PROCEDURE — 71271 CT THORAX LUNG CANCER SCR C-: CPT

## 2024-01-29 PROCEDURE — 80061 LIPID PANEL: CPT

## 2024-01-29 PROCEDURE — 36415 COLL VENOUS BLD VENIPUNCTURE: CPT

## 2024-01-30 DIAGNOSIS — S22.059A CLOSED FRACTURE OF SIXTH THORACIC VERTEBRA, UNSPECIFIED FRACTURE MORPHOLOGY, INITIAL ENCOUNTER (MULTI): Primary | ICD-10-CM

## 2024-01-30 DIAGNOSIS — J43.2 CENTRILOBULAR EMPHYSEMA (MULTI): ICD-10-CM

## 2024-02-09 ENCOUNTER — TELEMEDICINE (OUTPATIENT)
Dept: BEHAVIORAL HEALTH | Facility: CLINIC | Age: 64
End: 2024-02-09
Payer: MEDICARE

## 2024-02-09 DIAGNOSIS — F41.9 ANXIETY AND DEPRESSION: ICD-10-CM

## 2024-02-09 DIAGNOSIS — F41.9 ANXIETY: ICD-10-CM

## 2024-02-09 DIAGNOSIS — F32.A ANXIETY AND DEPRESSION: ICD-10-CM

## 2024-02-09 DIAGNOSIS — F39 PSYCHOTIC MOOD DISORDER (CMS-HCC): ICD-10-CM

## 2024-02-09 DIAGNOSIS — F31.9 BIPOLAR AND RELATED DISORDER (MULTI): ICD-10-CM

## 2024-02-09 PROCEDURE — 4004F PT TOBACCO SCREEN RCVD TLK: CPT | Performed by: PSYCHIATRY & NEUROLOGY

## 2024-02-09 PROCEDURE — 99213 OFFICE O/P EST LOW 20 MIN: CPT | Performed by: PSYCHIATRY & NEUROLOGY

## 2024-02-09 PROCEDURE — 1126F AMNT PAIN NOTED NONE PRSNT: CPT | Performed by: PSYCHIATRY & NEUROLOGY

## 2024-02-09 PROCEDURE — 1159F MED LIST DOCD IN RCRD: CPT | Performed by: PSYCHIATRY & NEUROLOGY

## 2024-02-09 PROCEDURE — 1123F ACP DISCUSS/DSCN MKR DOCD: CPT | Performed by: PSYCHIATRY & NEUROLOGY

## 2024-02-09 RX ORDER — SERTRALINE HYDROCHLORIDE 50 MG/1
50 TABLET, FILM COATED ORAL DAILY
Qty: 30 TABLET | Refills: 2 | Status: SHIPPED | OUTPATIENT
Start: 2024-02-09 | End: 2024-03-08 | Stop reason: SDUPTHER

## 2024-02-09 RX ORDER — QUETIAPINE FUMARATE 25 MG/1
50 TABLET, FILM COATED ORAL NIGHTLY
Qty: 60 TABLET | Refills: 2 | Status: SHIPPED | OUTPATIENT
Start: 2024-02-09 | End: 2024-03-08 | Stop reason: SDUPTHER

## 2024-02-09 ASSESSMENT — ENCOUNTER SYMPTOMS
NERVOUS/ANXIOUS: 1
DYSPHORIC MOOD: 1
SLEEP DISTURBANCE: 1

## 2024-02-09 NOTE — PROGRESS NOTES
Subjective   Patient ID: Saturnino Cooper is a 63 y.o. male who presents for No chief complaint on file. The patient exhibits severe verbal impoverished responses speaking in only simple phrases.    The patient engaged in a telehealth session via Epic audio visual or phone with this provider practicing within the [facility] [place]. The identity of the patient was verified by their date of birth and last four digits of their social security number. The provider demonstrated that confidentially was preserved at their location. The patient was informed that they were responsible for ensuring confidentially was secured at their location. The patient's location was documented for emergency purposes. The patient was informed of the necessary steps that would occur if an emergency was to occur or technology failed during session.     The patient is alert, only oriented to self and situation, language is severely impoverished with global dysphasia. The patient presents with recent and remote memory loss. The patient denies any suicidal or homicidal ideation or plans. The patient presents with anxious, paranoid fearful withdrawal, mood swings and intermittent agitation. Thought is impaired. Judgment  and insight are severely impaired. Behavioral disturbances are largely evidenced by confusion with executive and cognitive dysfunction.     The patient is seen with his full time  brother throughout this appointment.        Review of Systems   Neurological:         The patient is alert, only oriented to self and situation, language is severely impoverished with global dysphasia. The patient presents with recent and remote memory loss. The patient denies any suicidal or homicidal ideation or plans. The patient presents with anxious, paranoid fearful withdrawal, mood swings and intermittent agitation. Thought is impaired. Judgment  and insight are severely impaired. Behavioral disturbances are largely evidenced by  confusion with executive and cognitive dysfunction. The patient is seen with his full time  brother throughout this appointment.     Psychiatric/Behavioral:  Positive for behavioral problems, dysphoric mood and sleep disturbance. The patient is nervous/anxious.         The patient is alert, only oriented to self and situation, language is severely impoverished with global dysphasia. The patient presents with recent and remote memory loss. The patient denies any suicidal or homicidal ideation or plans. The patient presents with anxious, paranoid fearful withdrawal, mood swings and intermittent agitation. Thought is impaired. Judgment  and insight are severely impaired. Behavioral disturbances are largely evidenced by confusion with executive and cognitive dysfunction. The patient is seen with his full time  brother throughout this appointment.       All other systems reviewed and are negative.      Objective   Physical Exam  Neurological:      General: No focal deficit present.      Mental Status: He is alert and oriented to person, place, and time.      Comments: The patient is alert, only oriented to self and situation, language is severely impoverished with global dysphasia. The patient presents with recent and remote memory loss. The patient denies any suicidal or homicidal ideation or plans. The patient presents with anxious, paranoid fearful withdrawal, mood swings and intermittent agitation. Thought is impaired. Judgment  and insight are severely impaired. Behavioral disturbances are largely evidenced by confusion with executive and cognitive dysfunction. The patient is seen with his full time  brother throughout this appointment.     Psychiatric:      Comments: The patient is alert, fully oriented, language is intact, and recent and remote memory intact. The patient denies any suicidal or homicidal ideation or plans. The patient presents with mixed anxious,  panic and depressive features with poor sleep and no clear psychotic symptoms. Thought is impoverished. Judgment and insight are significantly impaired. The patient can't explain his chronic high anxiety and dysphoria. He reports to his brother that he is fearful consistently and is withdrawn. He evidences significant mood fluctuation.  We discussed the differential between unipolar mood disorder versus a bipolar mood disorder probably secondary to or in addition to a dementia with the patient and his caregiver and live in brother.          Lab Review:       Assessment/Plan   The FDA risks, benefits & alternatives to the medications prescribed were explained to you and your support person, your brother who is you main caregiver and who lives with you, today. You & your brother were able to understand & repeat these risks, benefits & alternatives to these prescribed medications. You and your  brother have agreed to proceed with treatment with the medications discussed based on the conclusion that the benefit outweighs the risks of this treatment regimen: continue sertraline 50 mg daily with food and increase quetiapine to 50 mg nightly. Follow up in 3 to 4 weeks virtually through [facility] and in person at the next available in July or August of 2024 all through [facility].

## 2024-02-15 ENCOUNTER — PATIENT OUTREACH (OUTPATIENT)
Dept: CARE COORDINATION | Facility: CLINIC | Age: 64
End: 2024-02-15
Payer: MEDICARE

## 2024-02-15 NOTE — PROGRESS NOTES
Patient has met target of no readmission for (90) days post (hospital,discharge and is graduated from Transitional Care Management program at this time.

## 2024-02-16 ENCOUNTER — OFFICE VISIT (OUTPATIENT)
Dept: PODIATRY | Facility: CLINIC | Age: 64
End: 2024-02-16
Payer: MEDICARE

## 2024-02-16 DIAGNOSIS — Q66.72 CONGENITAL BILATERAL PES CAVUS: ICD-10-CM

## 2024-02-16 DIAGNOSIS — M79.672 LEFT FOOT PAIN: ICD-10-CM

## 2024-02-16 DIAGNOSIS — B35.1 TINEA UNGUIUM: ICD-10-CM

## 2024-02-16 DIAGNOSIS — M20.41 ACQUIRED HAMMER TOE DEFORMITY OF LESSER TOE OF BOTH FEET: Primary | ICD-10-CM

## 2024-02-16 DIAGNOSIS — M79.675 GREAT TOE PAIN, LEFT: ICD-10-CM

## 2024-02-16 DIAGNOSIS — Q66.71 CONGENITAL BILATERAL PES CAVUS: ICD-10-CM

## 2024-02-16 DIAGNOSIS — M79.674 TOE PAIN, RIGHT: ICD-10-CM

## 2024-02-16 DIAGNOSIS — M79.671 RIGHT FOOT PAIN: ICD-10-CM

## 2024-02-16 DIAGNOSIS — M20.42 ACQUIRED HAMMER TOE DEFORMITY OF LESSER TOE OF BOTH FEET: Primary | ICD-10-CM

## 2024-02-16 DIAGNOSIS — M79.675 TOE PAIN, LEFT: ICD-10-CM

## 2024-02-16 PROCEDURE — 1123F ACP DISCUSS/DSCN MKR DOCD: CPT | Performed by: PODIATRIST

## 2024-02-16 PROCEDURE — 4004F PT TOBACCO SCREEN RCVD TLK: CPT | Performed by: PODIATRIST

## 2024-02-16 PROCEDURE — 1126F AMNT PAIN NOTED NONE PRSNT: CPT | Performed by: PODIATRIST

## 2024-02-16 PROCEDURE — 1159F MED LIST DOCD IN RCRD: CPT | Performed by: PODIATRIST

## 2024-02-16 PROCEDURE — 99202 OFFICE O/P NEW SF 15 MIN: CPT | Performed by: PODIATRIST

## 2024-02-16 PROCEDURE — 11720 DEBRIDE NAIL 1-5: CPT | Performed by: PODIATRIST

## 2024-02-18 RX ORDER — CICLOPIROX 80 MG/ML
SOLUTION TOPICAL ONCE
Status: SHIPPED | OUTPATIENT
Start: 2024-02-18

## 2024-02-18 NOTE — PROGRESS NOTES
CC: foot/toe pain    HPI:  Patient seen with brother for nail fungus and toepain, long standing, painful hammertoes.    PCP: Dr. Hicks  Last visit: 1-29-24     PMH  Past Medical History:   Diagnosis Date    Abnormal LFTs 01/25/2023    Alcohol dependency (CMS/[facility]) 01/25/2023    Alcoholic encephalopathy (CMS/HCC) 01/25/2023    BRANDI positive 01/25/2023    Closed fracture of right hip (CMS/HCC) 01/25/2023    Creatinine elevation 01/25/2023    GERD (gastroesophageal reflux disease) 01/25/2023    Hypercalcemia 01/25/2023    Hyponatremia 01/25/2023    Myocardial infarction (CMS/HCC) 01/25/2023    Pericarditis, acute 01/25/2023    Personal history of other diseases of the circulatory system 06/27/2019    History of coronary artery disease    Personal history of other endocrine, nutritional and metabolic disease 04/18/2019    History of hyperlipidemia    Pneumothorax, iatrogenic 01/25/2023    Stage 3a chronic kidney disease (CMS/HCC) 01/25/2023    Unspecified dementia, unspecified severity, without behavioral disturbance, psychotic disturbance, mood disturbance, and anxiety (CMS/HCC) 07/15/2021     MEDS    Current Outpatient Medications:     aspirin 81 mg EC tablet, Take 1 tablet (81 mg) by mouth once daily., Disp: , Rfl:     atorvastatin (Lipitor) 40 mg tablet, Take 1.5 tablets (60 mg) by mouth once every day., Disp: 90 tablet, Rfl: 1    b complex 0.4 mg tablet, Take 1 tablet by mouth once daily., Disp: 90 tablet, Rfl: 3    hydrOXYzine HCL (Atarax) 25 mg tablet, Take 1-2 tablets (25-50 mg) by mouth once daily as needed (severe anxiety)., Disp: , Rfl:     metoprolol tartrate (Lopressor) 25 mg tablet, Take 1 tablet (25 mg) by mouth 2 times a day., Disp: 180 tablet, Rfl: 0    pantoprazole (ProtoNix) 40 mg EC tablet, Take 1 tablet (40 mg) by mouth once daily in the morning. Take before meals. Do not crush, chew, or split., Disp: 90 tablet, Rfl: 0    QUEtiapine (SEROquel) 25 mg tablet, Take 2 tablets (50 mg) by mouth once  daily at bedtime., Disp: 60 tablet, Rfl: 2    sertraline (Zoloft) 50 mg tablet, Take 1 tablet (50 mg) by mouth once daily., Disp: 30 tablet, Rfl: 2  Allergies  No Known Allergies  Social History     Socioeconomic History    Marital status: Single     Spouse name: Not on file    Number of children: Not on file    Years of education: Not on file    Highest education level: Not on file   Occupational History    Not on file   Tobacco Use    Smoking status: Every Day     Packs/day: 1.00     Years: 40.00     Additional pack years: 0.00     Total pack years: 40.00     Types: Cigarettes    Smokeless tobacco: Never   Vaping Use    Vaping Use: Never used   Substance and Sexual Activity    Alcohol use: Not Currently    Drug use: Not Currently    Sexual activity: Not on file   Other Topics Concern    Not on file   Social History Narrative    Not on file     Social Determinants of Health     Financial Resource Strain: Low Risk  (1/26/2024)    Overall Financial Resource Strain (CARDIA)     Difficulty of Paying Living Expenses: Not hard at all   Food Insecurity: No Food Insecurity (1/26/2024)    Hunger Vital Sign     Worried About Running Out of Food in the Last Year: Never true     Ran Out of Food in the Last Year: Never true   Transportation Needs: No Transportation Needs (1/26/2024)    PRAPARE - Transportation     Lack of Transportation (Medical): No     Lack of Transportation (Non-Medical): No   Physical Activity: Insufficiently Active (1/26/2024)    Exercise Vital Sign     Days of Exercise per Week: 7 days     Minutes of Exercise per Session: 20 min   Stress: Stress Concern Present (1/26/2024)    [employer] [employer] - Occupational Stress Questionnaire     Feeling of Stress : Rather much   Social Connections: Moderately Integrated (1/26/2024)    Social Connection and Isolation Panel [NHANES]     Frequency of Communication with Friends and Family: More than three times a week     Frequency of Social  Gatherings with Friends and Family: More than three times a week     Attends Holiness Services: 1 to 4 times per year     Active Member of Clubs or Organizations: No     Attends Club or Organization Meetings: 1 to 4 times per year     Marital Status:    Intimate Partner Violence: Not At Risk (1/26/2024)    Humiliation, Afraid, Rape, and Kick questionnaire     Fear of Current or Ex-Partner: No     Emotionally Abused: No     Physically Abused: No     Sexually Abused: No   Housing Stability: Low Risk  (1/26/2024)    Housing Stability Vital Sign     Unable to Pay for Housing in the Last Year: No     Number of Places Lived in the Last Year: 1     Unstable Housing in the Last Year: No     Family History   Problem Relation Name Age of Onset    Lung cancer Mother      Heart attack Father      Heart attack Brother       Past Surgical History:   Procedure Laterality Date    CORONARY ARTERY BYPASS GRAFT  03/28/2014    Coronary Artery Surgery    CORONARY ARTERY BYPASS GRAFT  09/08/2014    CABG    SHOULDER SURGERY  03/28/2014    Shoulder Surgery    VASECTOMY  03/28/2014    Surgery Vas Deferens Vasectomy       REVIEW OF SYSTEMS    + as noted above in HPI.       Physical examination:   On General Observation: Patient is a pleasant, cooperative, well developed 63 y.o.  adult male. The patient is alert and oriented to time, place and person. Patient has normal affect and mood.  There were no vitals taken for this visit.    Vascular:  DP and PT pulses are 1-2/4 b/l.  no edema noted. no varicosities b/l.  CFT  5 seconds to all digits bilateral.  Skin temperature is warm to warm from proximal to distal bilateral.      Muscular: Strength is 5/5 b/l.  Motor strength is normal and symmetric proximally, as well as distally, in all four extremities. Good mobility of all extremities.  Tenderness to toes.     Neuro:  Proprioception present.   Sensation to vibration is  intact. Protective sensation present  at all pedal sites via  [place] Steve 5.07 monofilament bilateral.  Light touch present bilateral.     Derm:  thickness and discoloration of the toes are present.    Ortho:  Cavus type foot is present with nonreducible contractions at the pipj toes 1-5 b/l le    ASSESSMENT:    Hammertoes b/l le  Cavus foot  Nail fungus  Pain toes/feet     PLAN:   Consult  A comprehensive history and physical examination were preformed. The patient was educated on clinical findings, diagnosis and treatment plans. Patient understands all that has been explained and all questions were answered to apparent satisfaction.   -Reviewed with brother the etiology and treatment options, recommend otc crest pads for the hammertoes initially, recommend orthotics long term, debrided the hallux nails in length and height, reviewed treatment options, will start topical penlac, reviewed time frame.        Luis Doweny DPM

## 2024-02-28 ENCOUNTER — TELEPHONE (OUTPATIENT)
Dept: PRIMARY CARE | Facility: CLINIC | Age: 64
End: 2024-02-28

## 2024-02-28 NOTE — TELEPHONE ENCOUNTER
2/27/24  [facility] REQUESTING ORDER FOR ORTHOTICS BE FAXED [phone], ATTENTION: ADDITIONAL INFO. CASE #[id number]. MUST BE RECEIVED BY CLOSE ON 2/28/24. THANK YOU!    2/28/24  RECEIVED FAX FROM [facility]. CPT  X2  DX Q66.71, Q66.72, M20.41 AND M20.42 HAS BEEN APPROVED. GOOD 2/23/24 THRU 4/22/24. 1 PAIR OF CUSTOM MOLDED ORTHOTICS.  PATIENT MUST SIGN ABN FORM.  APPROVAL #[id number].    SPOKE W BROTHER, VERNA AND GAVE HIM THE ABOVE INFO. PATIENT MUST SIGN ABN. PATIENT SCHEDULED TO BE MOLDED ON 3/7/24 @ 2:00 PM. THANK YOU!

## 2024-03-07 ENCOUNTER — APPOINTMENT (OUTPATIENT)
Dept: PODIATRY | Facility: CLINIC | Age: 64
End: 2024-03-07
Payer: MEDICARE

## 2024-03-07 DIAGNOSIS — F31.9 BIPOLAR AND RELATED DISORDER (MULTI): ICD-10-CM

## 2024-03-07 DIAGNOSIS — F32.A ANXIETY AND DEPRESSION: ICD-10-CM

## 2024-03-07 DIAGNOSIS — F41.9 ANXIETY AND DEPRESSION: ICD-10-CM

## 2024-03-08 ENCOUNTER — TELEMEDICINE (OUTPATIENT)
Dept: BEHAVIORAL HEALTH | Facility: CLINIC | Age: 64
End: 2024-03-08
Payer: MEDICARE

## 2024-03-08 DIAGNOSIS — F33.41 RECURRENT MAJOR DEPRESSIVE DISORDER, IN PARTIAL REMISSION (CMS-HCC): ICD-10-CM

## 2024-03-08 DIAGNOSIS — F41.9 ANXIETY: ICD-10-CM

## 2024-03-08 DIAGNOSIS — F41.9 ANXIETY AND DEPRESSION: ICD-10-CM

## 2024-03-08 DIAGNOSIS — F31.9 BIPOLAR AND RELATED DISORDER (MULTI): ICD-10-CM

## 2024-03-08 DIAGNOSIS — F32.A ANXIETY AND DEPRESSION: ICD-10-CM

## 2024-03-08 PROCEDURE — 4004F PT TOBACCO SCREEN RCVD TLK: CPT | Performed by: PSYCHIATRY & NEUROLOGY

## 2024-03-08 PROCEDURE — 1159F MED LIST DOCD IN RCRD: CPT | Performed by: PSYCHIATRY & NEUROLOGY

## 2024-03-08 PROCEDURE — 99214 OFFICE O/P EST MOD 30 MIN: CPT | Performed by: PSYCHIATRY & NEUROLOGY

## 2024-03-08 PROCEDURE — 1123F ACP DISCUSS/DSCN MKR DOCD: CPT | Performed by: PSYCHIATRY & NEUROLOGY

## 2024-03-08 PROCEDURE — 1126F AMNT PAIN NOTED NONE PRSNT: CPT | Performed by: PSYCHIATRY & NEUROLOGY

## 2024-03-08 RX ORDER — QUETIAPINE FUMARATE 25 MG/1
50 TABLET, FILM COATED ORAL NIGHTLY
Qty: 180 TABLET | Refills: 1 | Status: SHIPPED | OUTPATIENT
Start: 2024-03-08 | End: 2024-09-04

## 2024-03-08 RX ORDER — SERTRALINE HYDROCHLORIDE 50 MG/1
50 TABLET, FILM COATED ORAL DAILY
Qty: 90 TABLET | Refills: 1 | Status: SHIPPED | OUTPATIENT
Start: 2024-03-08 | End: 2024-09-04

## 2024-03-08 RX ORDER — QUETIAPINE FUMARATE 25 MG/1
50 TABLET, FILM COATED ORAL NIGHTLY
Qty: 180 TABLET | Refills: 1 | OUTPATIENT
Start: 2024-03-08 | End: 2024-06-06

## 2024-03-08 NOTE — PROGRESS NOTES
Subjective   Patient ID: Saturnino Cooper is a 63 y.o. male who presents for No chief complaint on file. I am doing a lot better.    The patient engaged in a telehealth session via Epic audio visual or phone with this provider practicing within the [facility]. The identity of the patient was verified by their date of birth and last four digits of their social security number. The provider demonstrated that confidentially was preserved at their location. The patient was informed that they were responsible for ensuring confidentially was secured at their location. The patient's location was documented for emergency purposes. The patient was informed of the necessary steps that would occur if an emergency was to occur or technology failed during session.     The patient is alert, only oriented to self and situation, language is impoverished with global dysphasia. The patient presents with recent and remote memory loss. The patient denies any suicidal or homicidal ideation or plans. The patient had presented with anxious, paranoid fearful withdrawal, mood swings and intermittent agitation all substantially reduced and in substantial remission. Thought is impaired. Judgment  and insight are impaired. Behavioral disturbances are largely evidenced by confusion with executive and cognitive dysfunction which have all shown improvement.     The patient is seen with his full time  brother throughout this appointment.        Review of Systems   Neurological:         The patient is alert, only oriented to self and situation, language is impoverished with global dysphasia. The patient presents with recent and remote memory loss. The patient denies any suicidal or homicidal ideation or plans. The patient had presented with anxious, paranoid fearful withdrawal, mood swings and intermittent agitation all substantially reduced and in substantial remission. Thought is impaired. Judgment  and insight are impaired.  Behavioral disturbances are largely evidenced by confusion with executive and cognitive dysfunction which have all shown improvement.   The patient is seen with his full time  brother throughout this appointment.     Psychiatric/Behavioral:          The patient is alert, only oriented to self and situation, language is impoverished with global dysphasia. The patient presents with recent and remote memory loss. The patient denies any suicidal or homicidal ideation or plans. The patient had presented with anxious, paranoid fearful withdrawal, mood swings and intermittent agitation all substantially reduced and in substantial remission. Thought is impaired. Judgment  and insight are impaired. Behavioral disturbances are largely evidenced by confusion with executive and cognitive dysfunction which have all shown improvement.          All other systems reviewed and are negative.      Objective   Physical Exam  Neurological:      General: No focal deficit present.      Mental Status: He is alert and oriented to person, place, and time.      Comments: The patient is alert, only oriented to self and situation, language is impoverished with global dysphasia. The patient presents with recent and remote memory loss. The patient denies any suicidal or homicidal ideation or plans. The patient had presented with anxious, paranoid fearful withdrawal, mood swings and intermittent agitation all substantially reduced and in substantial remission. Thought is impaired. Judgment  and insight are impaired. Behavioral disturbances are largely evidenced by confusion with executive and cognitive dysfunction which have all shown improvement.   The patient is seen with his full time  brother throughout this appointment.     Psychiatric:      Comments: The patient is alert, only oriented to self and situation, language is impoverished with global dysphasia. The patient presents with recent and remote memory  loss. The patient denies any suicidal or homicidal ideation or plans. The patient had presented with anxious, paranoid fearful withdrawal, mood swings and intermittent agitation all substantially reduced and in substantial remission. Thought is impaired. Judgment  and insight are impaired. Behavioral disturbances are largely evidenced by confusion with executive and cognitive dysfunction which have all shown improvement.   The patient is seen with his full time live in care giver brother throughout this appointment today.         Lab Review:       Assessment/Plan   The FDA risks, benefits & alternatives to the medications prescribed were explained to you and your support person, your brother who is you main caregiver and who lives with you, today. You & your brother were able to understand & repeat these risks, benefits & alternatives to these prescribed medications. You and your  brother have agreed to proceed with treatment with the medications discussed based on the conclusion that the benefit outweighs the risks of this treatment regimen: continue sertraline 50 mg daily with food and quetiapine to 50 mg nightly.   Follow up in July or August of 2024.

## 2024-04-30 DIAGNOSIS — J43.2 CENTRILOBULAR EMPHYSEMA (MULTI): ICD-10-CM

## 2024-04-30 DIAGNOSIS — S22.059A CLOSED FRACTURE OF SIXTH THORACIC VERTEBRA, UNSPECIFIED FRACTURE MORPHOLOGY, INITIAL ENCOUNTER (MULTI): Primary | ICD-10-CM

## 2024-04-30 DIAGNOSIS — R91.8 PULMONARY NODULES: ICD-10-CM

## 2024-05-16 NOTE — PROGRESS NOTES
"Primary Care Physician: Chente Hicks MD  Date of Visit: 05/20/2024  1:20 PM EDT  Location of visit: [address]     Chief Complaint:   Follow up visit     HPI / Summary:   62 y/o with h/o CAD s/p CABG x 3 2014 (L-LAD seq to D1, VIVEK-OM1), HTN, prior pericarditis, ?ETOH dementia      He has h/o Dementia/encephalopathy which previously attributed to EtOH. Accompanied by brother who is his primary care giver. Brother provides all relevant history. However, brother was not around at time of CABG. Unclear whether patient had symptoms at that time. No evidence of active ischemic symptoms like chest pain shortness of breath lightheadedness dizziness presyncope or syncope.     hospitalization 10/22 for hyponatremia at [facility]. All his Bp meds were stopped. According to brother he was drinking \"a lot of water.\" He drank 25 bottles of 16 oz water in about a 24-36 hr timespan.        interval events:  Another hospitalization 11/20/2023 at [place] for hyponatremia and MARK. 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  He denies any chest pain shortness of breath lightheadedness dizziness presyncope or syncope.     Continues to smoke now 2packs a day. Last etoh use reportedly June 2022  ECG 5/20/2024: NSR, normal ECG    ECG 2/23: sinus tachycardia with heart rate 116 bpm, otherwise normal     ECHO 1/2022: EF 55%, no valvular disease     famhx: father with MI age 41, brother with stents  sochx: current smoker drinks occasionally and will have 6 pack at times       Past Medical History:  Past Medical History:   Diagnosis Date    Abnormal LFTs 01/25/2023    Alcohol dependency (Multi) 01/25/2023    Alcoholic encephalopathy (CMS-HCC) 01/25/2023    BRANDI positive 01/25/2023    Closed fracture of right hip (Multi) 01/25/2023    Creatinine elevation 01/25/2023    GERD (gastroesophageal reflux disease) 01/25/2023    Hypercalcemia 01/25/2023    Hyponatremia 01/25/2023 "    Myocardial infarction (Multi) 01/25/2023    Pericarditis, acute ([facility]) 01/25/2023    Personal history of other diseases of the circulatory system 06/27/2019    History of coronary artery disease    Personal history of other endocrine, nutritional and metabolic disease 04/18/2019    History of hyperlipidemia    Pneumothorax, iatrogenic 01/25/2023    Stage 3a chronic kidney disease (Multi) 01/25/2023    Unspecified dementia, unspecified severity, without behavioral disturbance, psychotic disturbance, mood disturbance, and anxiety (Multi) 07/15/2021        Past Surgical History:  Past Surgical History:   Procedure Laterality Date    CORONARY ARTERY BYPASS GRAFT  03/28/2014    Coronary Artery Surgery    CORONARY ARTERY BYPASS GRAFT  09/08/2014    CABG    SHOULDER SURGERY  03/28/2014    Shoulder Surgery    VASECTOMY  03/28/2014    Surgery Vas Deferens Vasectomy          Social History:  He reports that he has been smoking cigarettes. He has a 40 pack-year smoking history. He has never used smokeless tobacco. He reports that he does not currently use alcohol. He reports that he does not currently use drugs.    Family History:  family history includes Heart attack in his brother and father; Lung cancer in his mother.      Allergies:  No Known Allergies    Outpatient Medications:  Current Outpatient Medications   Medication Instructions    aspirin 81 mg EC tablet 1 tablet, oral, Daily    atorvastatin (LIPITOR) 60 mg, oral, Every Day    b complex 0.4 mg tablet 1 tablet, oral, Daily    hydrOXYzine HCL (Atarax) 25 mg tablet 1-2 tablets, oral, Daily PRN    metoprolol tartrate (LOPRESSOR) 25 mg, oral, 2 times daily    pantoprazole (PROTONIX) 40 mg, oral, Daily before breakfast, Do not crush, chew, or split.    QUEtiapine (SEROQUEL) 50 mg, oral, Nightly    sertraline (ZOLOFT) 50 mg, oral, Daily       Physical Exam:  GENERAL: alert, cooperative, pleasant, in no acute distress  SKIN: warm, dry, no rash.  NECK: no JVD, no  "CHRISTINE  CARDIAC: Regular rate and rhythm with no rubs, murmurs, or gallops  CHEST: Normal respiratory efforts, lungs clear to auscultation bilaterally.  ABDOMEN: soft, nontender, nondistended  EXTREMITIES: no edema  NEURO: Alert and oriented x 3.  Grossly normal.  Moves all 4 extremities.    Vitals:    05/20/24 1336   BP: 117/79   BP Location: Left arm   Pulse: 75   SpO2: 98%   Weight: 73 kg (161 lb)     Wt Readings from Last 5 Encounters:   01/11/24 66.7 kg (147 lb)   12/14/23 67.6 kg (149 lb)   07/10/23 61.7 kg (136 lb)   05/22/23 (!) 563 kg (1241 lb 9.6 oz)   05/02/23 63.7 kg (140 lb 6 oz)     Body mass index is 23.1 kg/m².        Last Labs:  CMP:  Recent Labs     04/03/23  1252 12/29/22  1431 05/23/22  1318    136 141   K 5.1 4.3 4.7    99 105   CO2 26 23 25   ANIONGAP 16 18 16   BUN 15 20 24*   CREATININE 1.29 1.28 1.51*   GLUCOSE 124* 111* 96     Recent Labs     04/03/23  1252 12/29/22  1431 12/20/21  1606   ALBUMIN 4.7 4.9 4.8   ALKPHOS 72 77 83   ALT 30 18 27   AST 30 19 30   BILITOT 0.3 0.5 0.6     CBC:  Recent Labs     04/03/23  1252 12/29/22  1431 04/19/22  1413   WBC 7.5 15.3* 12.5*   HGB 14.2 14.2 14.4   HCT 45.2 43.8 43.9    301 317   MCV 94 94 99     COAG: No results for input(s): \"INR\", \"DDIMERVTE\" in the last 65141 hours.  ENDO:  Recent Labs     12/29/22  1431 10/06/22  0453 12/20/21  1606   TSH 2.17  --  1.79   HGBA1C 5.4 5.2  --       CARDIAC: No results for input(s): \"LDH\", \"CKMB\", \"TROPHS\", \"BNP\" in the last 16787 hours.    No lab exists for component: \"CK\", \"CKMBP\"  Recent Labs     01/29/24  1307 12/29/22  1431 04/19/22  1413 12/20/21  1606   CHOL 172 138 134 152   LDLF  --  71 76 79   LDLCALC 104*  --   --   --    HDL 58.3 45.5 42.7 47.6   TRIG 49 106 76 127         Assessment/Plan   64 y/o with h/o CAD s/p CABG x 3 2014 (L-LAD seq to D1, VIVEK-OM1), HTN, prior pericarditis, ETOH dementia, current smoker (not willing to quit) seems to be stable cardiac wise since his surgery. " .     1. Coronary artery disease  Stable. Continue aspirin, statin. Resume metoprolol. LDL now high. Had been taking atorva 40. Increase back to 60 mg. Refill sent     2. chronic HTN - better control. Continue metoprolol. Now off lisinopril.     3. Tobacco use. Refer to tobacco cessation  4. Psychogenic polydipsia - following with psych     RTC ~1yr        Followup Appts:  No future appointments.          ____________________________________________________________  Emeterio Castanon DO  [facility]  [facility] [place]

## 2024-05-20 ENCOUNTER — OFFICE VISIT (OUTPATIENT)
Dept: CARDIOLOGY | Facility: CLINIC | Age: 64
End: 2024-05-20
Payer: MEDICARE

## 2024-05-20 VITALS
HEART RATE: 75 BPM | OXYGEN SATURATION: 98 % | SYSTOLIC BLOOD PRESSURE: 117 MMHG | BODY MASS INDEX: 23.1 KG/M2 | WEIGHT: 161 LBS | DIASTOLIC BLOOD PRESSURE: 79 MMHG

## 2024-05-20 DIAGNOSIS — I10 CHRONIC HYPERTENSION: Primary | ICD-10-CM

## 2024-05-20 DIAGNOSIS — F17.210 CIGARETTE SMOKER: ICD-10-CM

## 2024-05-20 DIAGNOSIS — I10 PRIMARY HYPERTENSION: ICD-10-CM

## 2024-05-20 DIAGNOSIS — F10.27 DEMENTIA ASSOCIATED WITH ALCOHOLISM WITH BEHAVIORAL DISTURBANCE (MULTI): ICD-10-CM

## 2024-05-20 DIAGNOSIS — E78.1 PURE HYPERTRIGLYCERIDEMIA: ICD-10-CM

## 2024-05-20 DIAGNOSIS — Z95.1 S/P CABG X 3: ICD-10-CM

## 2024-05-20 DIAGNOSIS — I25.10 CORONARY ARTERY STENOSIS: ICD-10-CM

## 2024-05-20 PROCEDURE — 3074F SYST BP LT 130 MM HG: CPT | Performed by: INTERNAL MEDICINE

## 2024-05-20 PROCEDURE — 99214 OFFICE O/P EST MOD 30 MIN: CPT | Performed by: INTERNAL MEDICINE

## 2024-05-20 PROCEDURE — 3078F DIAST BP <80 MM HG: CPT | Performed by: INTERNAL MEDICINE

## 2024-05-20 PROCEDURE — 93005 ELECTROCARDIOGRAM TRACING: CPT | Performed by: INTERNAL MEDICINE

## 2024-05-20 PROCEDURE — 1123F ACP DISCUSS/DSCN MKR DOCD: CPT | Performed by: INTERNAL MEDICINE

## 2024-05-20 PROCEDURE — 1159F MED LIST DOCD IN RCRD: CPT | Performed by: INTERNAL MEDICINE

## 2024-05-20 RX ORDER — METOPROLOL TARTRATE 25 MG/1
25 TABLET, FILM COATED ORAL 2 TIMES DAILY
Qty: 180 TABLET | Refills: 3 | Status: SHIPPED | OUTPATIENT
Start: 2024-05-20

## 2024-05-20 RX ORDER — ATORVASTATIN CALCIUM 40 MG/1
60 TABLET, FILM COATED ORAL
Qty: 90 TABLET | Refills: 3 | Status: SHIPPED | OUTPATIENT
Start: 2024-05-20

## 2024-05-21 LAB
ATRIAL RATE: 73 BPM
P AXIS: 33 DEGREES
P OFFSET: 200 MS
P ONSET: 140 MS
PR INTERVAL: 170 MS
Q ONSET: 225 MS
QRS COUNT: 12 BEATS
QRS DURATION: 92 MS
QT INTERVAL: 382 MS
QTC CALCULATION(BAZETT): 420 MS
QTC FREDERICIA: 408 MS
R AXIS: 34 DEGREES
T AXIS: 50 DEGREES
T OFFSET: 416 MS
VENTRICULAR RATE: 73 BPM

## 2024-06-05 DIAGNOSIS — I10 PRIMARY HYPERTENSION: ICD-10-CM

## 2024-06-10 RX ORDER — METOPROLOL TARTRATE 25 MG/1
25 TABLET, FILM COATED ORAL 2 TIMES DAILY
Qty: 180 TABLET | Refills: 3 | OUTPATIENT
Start: 2024-06-10

## 2024-06-17 ENCOUNTER — TELEPHONE (OUTPATIENT)
Dept: PRIMARY CARE | Facility: CLINIC | Age: 64
End: 2024-06-17
Payer: MEDICARE

## 2024-06-18 ENCOUNTER — TELEPHONE (OUTPATIENT)
Dept: PRIMARY CARE | Facility: CLINIC | Age: 64
End: 2024-06-18
Payer: MEDICARE

## 2024-07-19 ENCOUNTER — APPOINTMENT (OUTPATIENT)
Dept: PRIMARY CARE | Facility: CLINIC | Age: 64
End: 2024-07-19
Payer: MEDICARE

## 2024-09-22 DIAGNOSIS — E87.1 HYPONATREMIA: Primary | ICD-10-CM

## 2024-09-24 ENCOUNTER — APPOINTMENT (OUTPATIENT)
Dept: PRIMARY CARE | Facility: CLINIC | Age: 64
End: 2024-09-24
Payer: MEDICARE

## 2024-09-24 VITALS
OXYGEN SATURATION: 98 % | RESPIRATION RATE: 12 BRPM | HEART RATE: 76 BPM | SYSTOLIC BLOOD PRESSURE: 118 MMHG | HEIGHT: 70 IN | WEIGHT: 165 LBS | BODY MASS INDEX: 23.62 KG/M2 | DIASTOLIC BLOOD PRESSURE: 74 MMHG

## 2024-09-24 DIAGNOSIS — F41.1 GENERALIZED ANXIETY DISORDER: ICD-10-CM

## 2024-09-24 DIAGNOSIS — I10 PRIMARY HYPERTENSION: ICD-10-CM

## 2024-09-24 DIAGNOSIS — J43.2 CENTRILOBULAR EMPHYSEMA (MULTI): ICD-10-CM

## 2024-09-24 DIAGNOSIS — R56.9 SEIZURE (MULTI): ICD-10-CM

## 2024-09-24 DIAGNOSIS — E87.1 HYPONATREMIA: Primary | ICD-10-CM

## 2024-09-24 DIAGNOSIS — I25.10 CORONARY ARTERY STENOSIS: ICD-10-CM

## 2024-09-24 DIAGNOSIS — E78.1 PURE HYPERTRIGLYCERIDEMIA: ICD-10-CM

## 2024-09-24 DIAGNOSIS — F10.27 DEMENTIA ASSOCIATED WITH ALCOHOLISM WITH BEHAVIORAL DISTURBANCE (MULTI): ICD-10-CM

## 2024-09-24 DIAGNOSIS — F17.210 CIGARETTE SMOKER: ICD-10-CM

## 2024-09-24 PROBLEM — E44.0 MALNUTRITION OF MODERATE DEGREE (MULTI): Status: RESOLVED | Noted: 2022-10-07 | Resolved: 2024-09-24

## 2024-09-24 PROBLEM — I20.9 ANGINA, CLASS I (CMS-HCC): Status: RESOLVED | Noted: 2023-01-25 | Resolved: 2024-09-24

## 2024-09-24 PROBLEM — S22.059A: Status: RESOLVED | Noted: 2024-01-30 | Resolved: 2024-09-24

## 2024-09-24 PROCEDURE — 1123F ACP DISCUSS/DSCN MKR DOCD: CPT | Performed by: FAMILY MEDICINE

## 2024-09-24 PROCEDURE — 1159F MED LIST DOCD IN RCRD: CPT | Performed by: FAMILY MEDICINE

## 2024-09-24 PROCEDURE — 3008F BODY MASS INDEX DOCD: CPT | Performed by: FAMILY MEDICINE

## 2024-09-24 PROCEDURE — 3078F DIAST BP <80 MM HG: CPT | Performed by: FAMILY MEDICINE

## 2024-09-24 PROCEDURE — 4004F PT TOBACCO SCREEN RCVD TLK: CPT | Performed by: FAMILY MEDICINE

## 2024-09-24 PROCEDURE — 99214 OFFICE O/P EST MOD 30 MIN: CPT | Performed by: FAMILY MEDICINE

## 2024-09-24 PROCEDURE — 3074F SYST BP LT 130 MM HG: CPT | Performed by: FAMILY MEDICINE

## 2024-09-24 RX ORDER — IBUPROFEN 200 MG
1 TABLET ORAL EVERY 24 HOURS
Qty: 60 PATCH | Refills: 0 | Status: SHIPPED | OUTPATIENT
Start: 2024-09-24 | End: 2024-09-26 | Stop reason: SDUPTHER

## 2024-09-24 RX ORDER — DIVALPROEX SODIUM 125 MG/1
125 CAPSULE, COATED PELLETS ORAL 2 TIMES DAILY
COMMUNITY
Start: 2024-08-31

## 2024-09-24 RX ORDER — THIAMINE HCL 50 MG
50 TABLET ORAL DAILY
COMMUNITY

## 2024-09-24 RX ORDER — ASCORBIC ACID 125 MG
TABLET,CHEWABLE ORAL
COMMUNITY

## 2024-09-24 ASSESSMENT — ENCOUNTER SYMPTOMS
SHORTNESS OF BREATH: 0
FATIGUE: 0
ADENOPATHY: 0
ABDOMINAL PAIN: 0
FEVER: 0
CHEST TIGHTNESS: 0
DIZZINESS: 0
ARTHRALGIAS: 0
APPETITE CHANGE: 0
SORE THROAT: 0
COUGH: 0
BLOOD IN STOOL: 0
DIFFICULTY URINATING: 0
ABDOMINAL DISTENTION: 0
NERVOUS/ANXIOUS: 1
DYSURIA: 0
DIARRHEA: 0
WEAKNESS: 0
EYE PAIN: 0
HEADACHES: 0
CONSTIPATION: 0
CHILLS: 0
DYSPHORIC MOOD: 1
EYE REDNESS: 0
BRUISES/BLEEDS EASILY: 0
UNEXPECTED WEIGHT CHANGE: 1
BACK PAIN: 0

## 2024-09-24 NOTE — PROGRESS NOTES
Subjective   Patient ID: Saturnino Cooper is a 64 y.o. male who presents for Hospital Follow-up.  Pt has chronic and uncontrolled anxiety, psychogenic polydipsia with resulting seizure. He was at [facility] 8/8 with anxiety attack and again 8/26 with hyponatremia and SZ. We don't have records but he was discharged on Depakote, and Keppra but Keppra was not started being pharmacist remarked it may lower his sodium  Pt has supportive brother as caregiver.   Pt not seeing a counselor, has appt in [place] with Psychiatry and Psychology.  Taking Depakote and it is helping.   He is not drinking alcohol but did go back to smoking since discharge.    Pt has chronic CAD, HTN.  Pt is taking Metoprolol. Tolerating well.  Exercising 0 days per week   Low sodium diet is being followed.   Is not monitoring home blood pressures.  Denies HA, vision changes or CP.     Pt has Dyslipidemia.   Lipid panel showed LDL over goal in January.  Currently taking Atorvastatin and is tolerating well without muscle pains or weakness.             Review of Systems   Constitutional:  Positive for unexpected weight change (gaining back weight gradually with better appetite over this year). Negative for appetite change, chills, fatigue and fever.   HENT:  Negative for congestion, hearing loss and sore throat.    Eyes:  Negative for pain, redness and visual disturbance.   Respiratory:  Negative for cough, chest tightness and shortness of breath.    Cardiovascular:  Negative for chest pain and leg swelling.   Gastrointestinal:  Negative for abdominal distention, abdominal pain, blood in stool, constipation and diarrhea.   Genitourinary:  Negative for difficulty urinating and dysuria.   Musculoskeletal:  Negative for arthralgias and back pain.   Skin:  Negative for rash.   Neurological:  Negative for dizziness, weakness and headaches.   Hematological:  Negative for adenopathy. Does not bruise/bleed easily.   Psychiatric/Behavioral:  Positive for  "dysphoric mood. The patient is nervous/anxious.        Objective   /74   Pulse 76   Resp 12   Ht 1.778 m (5' 10\")   Wt 74.8 kg (165 lb)   SpO2 98%   BMI 23.68 kg/m²    Physical Exam  Constitutional:       General: He is not in acute distress.     Appearance: Normal appearance.   Cardiovascular:      Rate and Rhythm: Normal rate and regular rhythm.      Heart sounds: Normal heart sounds. No murmur heard.  Pulmonary:      Effort: Pulmonary effort is normal.      Breath sounds: Normal breath sounds.   Abdominal:      Palpations: Abdomen is soft.      Tenderness: There is no abdominal tenderness.   Neurological:      Mental Status: He is alert.   Psychiatric:         Mood and Affect: Mood normal.      Comments: At baseline, pleasant/anxious           Assessment/Plan   Diagnoses and all orders for this visit:  Hyponatremia/Seizure /Dementia associated with alcoholism- better not over drinking water recently. Need to get hospital records and will recheck appropriate labs. Keep plan to start Keppra.  Generalized anxiety disorder - not well controlled, keep plan to see psychiatry/psychology  Centrilobular emphysema/smoker - ordering nicotine patch to start on quit date  Coronary artery stenosis - continue to monitor with cardiology  Primary hypertension - controlled on Metoprolol    Follow up in 3 months, 30mins       "

## 2024-09-24 NOTE — PROGRESS NOTES
"Subjective   Patient ID: Saturnino Cooper is a 64 y.o. male who presents for Hospital Follow-up.    HPI     Review of Systems    Objective   /74   Pulse 76   Resp 12   Ht 1.778 m (5' 10\")   Wt 74.8 kg (165 lb)   SpO2 98%   BMI 23.68 kg/m²     Physical Exam    Assessment/Plan          "

## 2024-09-25 ENCOUNTER — TELEPHONE (OUTPATIENT)
Dept: PRIMARY CARE | Facility: CLINIC | Age: 64
End: 2024-09-25
Payer: MEDICARE

## 2024-09-25 ENCOUNTER — APPOINTMENT (OUTPATIENT)
Dept: CT IMAGING | Age: 64
End: 2024-09-25
Payer: MEDICARE

## 2024-09-25 ENCOUNTER — APPOINTMENT (OUTPATIENT)
Dept: GENERAL RADIOLOGY | Age: 64
End: 2024-09-25
Payer: MEDICARE

## 2024-09-25 ENCOUNTER — HOSPITAL ENCOUNTER (EMERGENCY)
Age: 64
Discharge: HOME | End: 2024-09-25
Attending: EMERGENCY MEDICINE
Payer: MEDICARE

## 2024-09-25 VITALS
TEMPERATURE: 98 F | WEIGHT: 165 LBS | SYSTOLIC BLOOD PRESSURE: 124 MMHG | BODY MASS INDEX: 23.62 KG/M2 | OXYGEN SATURATION: 99 % | HEIGHT: 70 IN | DIASTOLIC BLOOD PRESSURE: 74 MMHG | HEART RATE: 80 BPM | RESPIRATION RATE: 16 BRPM

## 2024-09-25 DIAGNOSIS — F41.1 ANXIETY STATE: Primary | ICD-10-CM

## 2024-09-25 LAB
ALBUMIN SERPL-MCNC: 4.2 G/DL (ref 3.5–5.2)
ALP SERPL-CCNC: 94 U/L (ref 40–129)
ALT SERPL-CCNC: 22 U/L (ref 0–40)
AMMONIA PLAS-SCNC: 21 UMOL/L (ref 16–60)
AMPHET UR QL SCN: NEGATIVE
ANION GAP SERPL CALCULATED.3IONS-SCNC: 14 MMOL/L (ref 7–16)
APAP SERPL-MCNC: <5 UG/ML (ref 10–30)
AST SERPL-CCNC: 19 U/L (ref 0–39)
BARBITURATES UR QL SCN: NEGATIVE
BASOPHILS # BLD: 0.05 K/UL (ref 0–0.2)
BASOPHILS NFR BLD: 1 % (ref 0–2)
BENZODIAZ UR QL: NEGATIVE
BILIRUB SERPL-MCNC: 0.2 MG/DL (ref 0–1.2)
BILIRUB UR QL STRIP: NEGATIVE
BILIRUB UR QL STRIP: NEGATIVE
BUN SERPL-MCNC: 17 MG/DL (ref 6–23)
BUPRENORPHINE UR QL: NEGATIVE
CALCIUM SERPL-MCNC: 9.5 MG/DL (ref 8.6–10.2)
CANNABINOIDS UR QL SCN: NEGATIVE
CHLORIDE SERPL-SCNC: 104 MMOL/L (ref 98–107)
CLARITY UR: CLEAR
CLARITY UR: CLEAR
CO2 SERPL-SCNC: 21 MMOL/L (ref 22–29)
COCAINE UR QL SCN: NEGATIVE
COLOR UR: YELLOW
COLOR UR: YELLOW
CREAT SERPL-MCNC: 1.3 MG/DL (ref 0.7–1.2)
EOSINOPHIL # BLD: 0.19 K/UL (ref 0.05–0.5)
EOSINOPHILS RELATIVE PERCENT: 2 % (ref 0–6)
ERYTHROCYTE [DISTWIDTH] IN BLOOD BY AUTOMATED COUNT: 13.4 % (ref 11.5–15)
ETHANOLAMINE SERPL-MCNC: <10 MG/DL (ref 0–0.08)
FENTANYL UR QL: NEGATIVE
GFR, ESTIMATED: 63 ML/MIN/1.73M2
GLUCOSE BLD-MCNC: 132 MG/DL (ref 74–99)
GLUCOSE SERPL-MCNC: 117 MG/DL (ref 74–99)
GLUCOSE UR STRIP-MCNC: NEGATIVE MG/DL
GLUCOSE UR STRIP-MCNC: NEGATIVE MG/DL
HCT VFR BLD AUTO: 41 % (ref 37–54)
HGB BLD-MCNC: 13.6 G/DL (ref 12.5–16.5)
HGB UR QL STRIP.AUTO: ABNORMAL
HGB UR QL STRIP.AUTO: ABNORMAL
IMM GRANULOCYTES # BLD AUTO: 0.03 K/UL (ref 0–0.58)
IMM GRANULOCYTES NFR BLD: 0 % (ref 0–5)
KETONES UR STRIP-MCNC: NEGATIVE MG/DL
KETONES UR STRIP-MCNC: NEGATIVE MG/DL
LACTATE BLDV-SCNC: 0.9 MMOL/L (ref 0.5–2.2)
LACTATE BLDV-SCNC: 2.3 MMOL/L (ref 0.5–2.2)
LEUKOCYTE ESTERASE UR QL STRIP: NEGATIVE
LEUKOCYTE ESTERASE UR QL STRIP: NEGATIVE
LIPASE SERPL-CCNC: 37 U/L (ref 13–60)
LYMPHOCYTES NFR BLD: 1.85 K/UL (ref 1.5–4)
LYMPHOCYTES RELATIVE PERCENT: 20 % (ref 20–42)
MAGNESIUM SERPL-MCNC: 1.9 MG/DL (ref 1.6–2.6)
MCH RBC QN AUTO: 30.1 PG (ref 26–35)
MCHC RBC AUTO-ENTMCNC: 33.2 G/DL (ref 32–34.5)
MCV RBC AUTO: 90.7 FL (ref 80–99.9)
METHADONE UR QL: NEGATIVE
MONOCYTES NFR BLD: 0.75 K/UL (ref 0.1–0.95)
MONOCYTES NFR BLD: 8 % (ref 2–12)
NEUTROPHILS NFR BLD: 69 % (ref 43–80)
NEUTS SEG NFR BLD: 6.35 K/UL (ref 1.8–7.3)
NITRITE UR QL STRIP: NEGATIVE
NITRITE UR QL STRIP: NEGATIVE
OPIATES UR QL SCN: NEGATIVE
OXYCODONE UR QL SCN: NEGATIVE
PCP UR QL SCN: NEGATIVE
PH UR STRIP: 5.5 [PH] (ref 5–9)
PH UR STRIP: 6 [PH] (ref 5–9)
PLATELET # BLD AUTO: 239 K/UL (ref 130–450)
PMV BLD AUTO: 9.8 FL (ref 7–12)
POTASSIUM SERPL-SCNC: 4.1 MMOL/L (ref 3.5–5)
PROT SERPL-MCNC: 6.8 G/DL (ref 6.4–8.3)
PROT UR STRIP-MCNC: NEGATIVE MG/DL
PROT UR STRIP-MCNC: NEGATIVE MG/DL
RBC # BLD AUTO: 4.52 M/UL (ref 3.8–5.8)
RBC #/AREA URNS HPF: ABNORMAL /HPF
RBC #/AREA URNS HPF: NORMAL /HPF
SALICYLATES SERPL-MCNC: <0.3 MG/DL (ref 0–30)
SODIUM SERPL-SCNC: 139 MMOL/L (ref 132–146)
SP GR UR STRIP: 1.01 (ref 1–1.03)
SP GR UR STRIP: 1.01 (ref 1–1.03)
TEST INFORMATION: NORMAL
TOXIC TRICYCLIC SC,BLOOD: NEGATIVE
TROPONIN I SERPL HS-MCNC: 21 NG/L (ref 0–11)
UROBILINOGEN UR STRIP-ACNC: 0.2 EU/DL (ref 0–1)
UROBILINOGEN UR STRIP-ACNC: 0.2 EU/DL (ref 0–1)
WBC #/AREA URNS HPF: ABNORMAL /HPF
WBC #/AREA URNS HPF: NORMAL /HPF
WBC OTHER # BLD: 9.2 K/UL (ref 4.5–11.5)

## 2024-09-25 PROCEDURE — 82140 ASSAY OF AMMONIA: CPT

## 2024-09-25 PROCEDURE — 80164 ASSAY DIPROPYLACETIC ACD TOT: CPT

## 2024-09-25 PROCEDURE — 80053 COMPREHEN METABOLIC PANEL: CPT

## 2024-09-25 PROCEDURE — 83605 ASSAY OF LACTIC ACID: CPT

## 2024-09-25 PROCEDURE — 85025 COMPLETE CBC W/AUTO DIFF WBC: CPT

## 2024-09-25 PROCEDURE — 6370000000 HC RX 637 (ALT 250 FOR IP): Performed by: EMERGENCY MEDICINE

## 2024-09-25 PROCEDURE — 80179 DRUG ASSAY SALICYLATE: CPT

## 2024-09-25 PROCEDURE — 2580000003 HC RX 258: Performed by: EMERGENCY MEDICINE

## 2024-09-25 PROCEDURE — 83735 ASSAY OF MAGNESIUM: CPT

## 2024-09-25 PROCEDURE — 70450 CT HEAD/BRAIN W/O DYE: CPT

## 2024-09-25 PROCEDURE — 71046 X-RAY EXAM CHEST 2 VIEWS: CPT

## 2024-09-25 PROCEDURE — 81001 URINALYSIS AUTO W/SCOPE: CPT

## 2024-09-25 PROCEDURE — 83690 ASSAY OF LIPASE: CPT

## 2024-09-25 PROCEDURE — 82962 GLUCOSE BLOOD TEST: CPT

## 2024-09-25 PROCEDURE — 93005 ELECTROCARDIOGRAM TRACING: CPT | Performed by: NURSE PRACTITIONER

## 2024-09-25 PROCEDURE — 87086 URINE CULTURE/COLONY COUNT: CPT

## 2024-09-25 PROCEDURE — G0480 DRUG TEST DEF 1-7 CLASSES: HCPCS

## 2024-09-25 PROCEDURE — 99285 EMERGENCY DEPT VISIT HI MDM: CPT

## 2024-09-25 PROCEDURE — 80307 DRUG TEST PRSMV CHEM ANLYZR: CPT

## 2024-09-25 PROCEDURE — 80143 DRUG ASSAY ACETAMINOPHEN: CPT

## 2024-09-25 PROCEDURE — 71250 CT THORAX DX C-: CPT

## 2024-09-25 PROCEDURE — 84484 ASSAY OF TROPONIN QUANT: CPT

## 2024-09-25 PROCEDURE — 96365 THER/PROPH/DIAG IV INF INIT: CPT

## 2024-09-25 PROCEDURE — 80156 ASSAY CARBAMAZEPINE TOTAL: CPT

## 2024-09-25 PROCEDURE — 6360000002 HC RX W HCPCS: Performed by: EMERGENCY MEDICINE

## 2024-09-25 RX ORDER — METOPROLOL TARTRATE 25 MG/1
25 TABLET, FILM COATED ORAL 2 TIMES DAILY
COMMUNITY

## 2024-09-25 RX ORDER — THIAMINE MONONITRATE (VIT B1) 100 MG
50 TABLET ORAL DAILY
COMMUNITY

## 2024-09-25 RX ORDER — ATORVASTATIN CALCIUM 40 MG/1
40 TABLET, FILM COATED ORAL DAILY
COMMUNITY

## 2024-09-25 RX ORDER — 0.9 % SODIUM CHLORIDE 0.9 %
1000 INTRAVENOUS SOLUTION INTRAVENOUS ONCE
Status: COMPLETED | OUTPATIENT
Start: 2024-09-25 | End: 2024-09-25

## 2024-09-25 RX ORDER — LORAZEPAM 1 MG/1
1 TABLET ORAL EVERY 6 HOURS PRN
Qty: 12 TABLET | Refills: 0 | Status: SHIPPED | OUTPATIENT
Start: 2024-09-25 | End: 2024-09-28

## 2024-09-25 RX ORDER — DIVALPROEX SODIUM 125 MG/1
125 CAPSULE, COATED PELLETS ORAL 2 TIMES DAILY
COMMUNITY

## 2024-09-25 RX ORDER — MAGNESIUM SULFATE IN WATER 40 MG/ML
2000 INJECTION, SOLUTION INTRAVENOUS ONCE
Status: COMPLETED | OUTPATIENT
Start: 2024-09-25 | End: 2024-09-25

## 2024-09-25 RX ORDER — LORAZEPAM 1 MG/1
1 TABLET ORAL ONCE
Status: COMPLETED | OUTPATIENT
Start: 2024-09-25 | End: 2024-09-25

## 2024-09-25 RX ORDER — LANOLIN ALCOHOL/MO/W.PET/CERES
5 CREAM (GRAM) TOPICAL NIGHTLY PRN
COMMUNITY

## 2024-09-25 RX ADMIN — SODIUM CHLORIDE 1000 ML: 9 INJECTION, SOLUTION INTRAVENOUS at 20:21

## 2024-09-25 RX ADMIN — MAGNESIUM SULFATE IN WATER 2000 MG: 40 INJECTION, SOLUTION INTRAVENOUS at 20:27

## 2024-09-25 RX ADMIN — LORAZEPAM 1 MG: 1 TABLET ORAL at 22:20

## 2024-09-25 ASSESSMENT — PAIN - FUNCTIONAL ASSESSMENT
PAIN_FUNCTIONAL_ASSESSMENT: NONE - DENIES PAIN
PAIN_FUNCTIONAL_ASSESSMENT: NONE - DENIES PAIN

## 2024-09-25 NOTE — TELEPHONE ENCOUNTER
Records received from [facility]. Labs looked good before discharge. Just recommend checking CMP lab panel in the next 1-2 weeks.

## 2024-09-26 DIAGNOSIS — F17.210 CIGARETTE SMOKER: ICD-10-CM

## 2024-09-26 LAB
AMPHET UR QL SCN: NEGATIVE
BARBITURATES UR QL SCN: NEGATIVE
BENZODIAZ UR QL: NEGATIVE
BUPRENORPHINE UR QL: NEGATIVE
CANNABINOIDS UR QL SCN: NEGATIVE
CARBAMAZEPINE SERPL-MCNC: <2 UG/ML (ref 4–10)
COCAINE UR QL SCN: NEGATIVE
FENTANYL UR QL: NEGATIVE
METHADONE UR QL: NEGATIVE
OPIATES UR QL SCN: NEGATIVE
OXYCODONE UR QL SCN: NEGATIVE
PCP UR QL SCN: NEGATIVE
TEST INFORMATION: NORMAL
VALPROATE SERPL-MCNC: 19 UG/ML (ref 50–100)

## 2024-09-26 RX ORDER — IBUPROFEN 200 MG
1 TABLET ORAL EVERY 24 HOURS
Qty: 60 PATCH | Refills: 0 | Status: CANCELLED | OUTPATIENT
Start: 2024-09-26 | End: 2024-11-25

## 2024-09-26 RX ORDER — IBUPROFEN 200 MG
1 TABLET ORAL EVERY 24 HOURS
Qty: 60 PATCH | Refills: 0 | Status: SHIPPED | OUTPATIENT
Start: 2024-09-26 | End: 2024-11-25

## 2024-09-26 NOTE — TELEPHONE ENCOUNTER
"Ativan in given in ER. Brother states PCP told him he will prescribe is someone else will. Brother states he has a recording on phone with PCP stating he will fill it if someone else will and also recording current phone call and will \"contact  and take us to court\". PCP states Ativan to be prescribed by psych. Referral already in the system per PCP and hospital.  "

## 2024-09-27 LAB
EKG ATRIAL RATE: 96 BPM
EKG P AXIS: 43 DEGREES
EKG P-R INTERVAL: 170 MS
EKG Q-T INTERVAL: 348 MS
EKG QRS DURATION: 100 MS
EKG QTC CALCULATION (BAZETT): 439 MS
EKG R AXIS: 58 DEGREES
EKG T AXIS: 43 DEGREES
EKG VENTRICULAR RATE: 96 BPM
MICROORGANISM SPEC CULT: ABNORMAL
SERVICE CMNT-IMP: ABNORMAL
SPECIMEN DESCRIPTION: ABNORMAL

## 2024-09-27 PROCEDURE — 93010 ELECTROCARDIOGRAM REPORT: CPT | Performed by: INTERNAL MEDICINE

## 2024-11-12 ENCOUNTER — HOSPITAL ENCOUNTER (INPATIENT)
Age: 64
DRG: 640 | End: 2024-11-12
Attending: EMERGENCY MEDICINE | Admitting: INTERNAL MEDICINE
Payer: MEDICARE

## 2024-11-12 ENCOUNTER — APPOINTMENT (OUTPATIENT)
Dept: CT IMAGING | Age: 64
DRG: 640 | End: 2024-11-12
Payer: MEDICARE

## 2024-11-12 ENCOUNTER — APPOINTMENT (OUTPATIENT)
Dept: GENERAL RADIOLOGY | Age: 64
DRG: 640 | End: 2024-11-12
Payer: MEDICARE

## 2024-11-12 DIAGNOSIS — E87.1 HYPONATREMIA: Primary | ICD-10-CM

## 2024-11-12 DIAGNOSIS — F54 PSYCHOGENIC POLYDIPSIA: ICD-10-CM

## 2024-11-12 DIAGNOSIS — R63.1 PSYCHOGENIC POLYDIPSIA: ICD-10-CM

## 2024-11-12 DIAGNOSIS — I24.9 ACUTE CORONARY SYNDROME (HCC): ICD-10-CM

## 2024-11-12 DIAGNOSIS — R79.89 ELEVATED TROPONIN: ICD-10-CM

## 2024-11-12 DIAGNOSIS — R79.89 ELEVATED LACTIC ACID LEVEL: ICD-10-CM

## 2024-11-12 PROBLEM — R41.82 AMS (ALTERED MENTAL STATUS): Status: ACTIVE | Noted: 2024-11-12

## 2024-11-12 LAB
ALBUMIN SERPL-MCNC: 4.9 G/DL (ref 3.5–5.2)
ALP SERPL-CCNC: 113 U/L (ref 40–129)
ALT SERPL-CCNC: 21 U/L (ref 0–40)
AMMONIA PLAS-SCNC: 38 UMOL/L (ref 16–60)
AMPHET UR QL SCN: NEGATIVE
ANION GAP SERPL CALCULATED.3IONS-SCNC: 15 MMOL/L (ref 7–16)
ANION GAP SERPL CALCULATED.3IONS-SCNC: 19 MMOL/L (ref 7–16)
APAP SERPL-MCNC: <5 UG/ML (ref 10–30)
AST SERPL-CCNC: 42 U/L (ref 0–39)
BACTERIA URNS QL MICRO: ABNORMAL
BARBITURATES UR QL SCN: NEGATIVE
BASOPHILS # BLD: 0.06 K/UL (ref 0–0.2)
BASOPHILS NFR BLD: 0 % (ref 0–2)
BENZODIAZ UR QL: NEGATIVE
BILIRUB DIRECT SERPL-MCNC: <0.2 MG/DL (ref 0–0.3)
BILIRUB INDIRECT SERPL-MCNC: ABNORMAL MG/DL (ref 0–1)
BILIRUB SERPL-MCNC: 0.8 MG/DL (ref 0–1.2)
BILIRUB UR QL STRIP: NEGATIVE
BUN SERPL-MCNC: 10 MG/DL (ref 6–23)
BUN SERPL-MCNC: 9 MG/DL (ref 6–23)
BUPRENORPHINE UR QL: NEGATIVE
CALCIUM SERPL-MCNC: 9.2 MG/DL (ref 8.6–10.2)
CALCIUM SERPL-MCNC: 9.9 MG/DL (ref 8.6–10.2)
CANNABINOIDS UR QL SCN: NEGATIVE
CHLORIDE SERPL-SCNC: 84 MMOL/L (ref 98–107)
CHLORIDE SERPL-SCNC: 89 MMOL/L (ref 98–107)
CLARITY UR: CLEAR
CO2 SERPL-SCNC: 18 MMOL/L (ref 22–29)
CO2 SERPL-SCNC: 19 MMOL/L (ref 22–29)
COCAINE UR QL SCN: NEGATIVE
COLOR UR: YELLOW
CREAT SERPL-MCNC: 1 MG/DL (ref 0.7–1.2)
CREAT SERPL-MCNC: 1 MG/DL (ref 0.7–1.2)
DATE LAST DOSE: ABNORMAL
EOSINOPHIL # BLD: 0.1 K/UL (ref 0.05–0.5)
EOSINOPHILS RELATIVE PERCENT: 1 % (ref 0–6)
ERYTHROCYTE [DISTWIDTH] IN BLOOD BY AUTOMATED COUNT: 12.4 % (ref 11.5–15)
ETHANOLAMINE SERPL-MCNC: <10 MG/DL (ref 0–0.08)
FENTANYL UR QL: NEGATIVE
GFR, ESTIMATED: 83 ML/MIN/1.73M2
GFR, ESTIMATED: 85 ML/MIN/1.73M2
GLUCOSE SERPL-MCNC: 105 MG/DL (ref 74–99)
GLUCOSE SERPL-MCNC: 99 MG/DL (ref 74–99)
GLUCOSE UR STRIP-MCNC: NEGATIVE MG/DL
HCT VFR BLD AUTO: 45.8 % (ref 37–54)
HGB BLD-MCNC: 15.7 G/DL (ref 12.5–16.5)
HGB UR QL STRIP.AUTO: ABNORMAL
IMM GRANULOCYTES # BLD AUTO: 0.09 K/UL (ref 0–0.58)
IMM GRANULOCYTES NFR BLD: 1 % (ref 0–5)
INR PPP: 1
KETONES UR STRIP-MCNC: NEGATIVE MG/DL
LACTATE BLDV-SCNC: 4.6 MMOL/L (ref 0.5–2.2)
LEUKOCYTE ESTERASE UR QL STRIP: NEGATIVE
LIPASE SERPL-CCNC: 27 U/L (ref 13–60)
LYMPHOCYTES NFR BLD: 2.31 K/UL (ref 1.5–4)
LYMPHOCYTES RELATIVE PERCENT: 13 % (ref 20–42)
MAGNESIUM SERPL-MCNC: 1.5 MG/DL (ref 1.6–2.6)
MCH RBC QN AUTO: 30 PG (ref 26–35)
MCHC RBC AUTO-ENTMCNC: 34.3 G/DL (ref 32–34.5)
MCV RBC AUTO: 87.4 FL (ref 80–99.9)
METHADONE UR QL: NEGATIVE
MONOCYTES NFR BLD: 1.09 K/UL (ref 0.1–0.95)
MONOCYTES NFR BLD: 6 % (ref 2–12)
NEUTROPHILS NFR BLD: 80 % (ref 43–80)
NEUTS SEG NFR BLD: 14.5 K/UL (ref 1.8–7.3)
NITRITE UR QL STRIP: NEGATIVE
OPIATES UR QL SCN: NEGATIVE
OXYCODONE UR QL SCN: NEGATIVE
PCP UR QL SCN: NEGATIVE
PH UR STRIP: 6.5 [PH] (ref 5–9)
PLATELET # BLD AUTO: 356 K/UL (ref 130–450)
PMV BLD AUTO: 9.4 FL (ref 7–12)
POTASSIUM SERPL-SCNC: 3.8 MMOL/L (ref 3.5–5)
POTASSIUM SERPL-SCNC: 4 MMOL/L (ref 3.5–5)
PROT SERPL-MCNC: 8.4 G/DL (ref 6.4–8.3)
PROT UR STRIP-MCNC: NEGATIVE MG/DL
PROTHROMBIN TIME: 11 SEC (ref 9.3–12.4)
RBC # BLD AUTO: 5.24 M/UL (ref 3.8–5.8)
RBC #/AREA URNS HPF: ABNORMAL /HPF
SALICYLATES SERPL-MCNC: <0.3 MG/DL (ref 0–30)
SODIUM SERPL-SCNC: 121 MMOL/L (ref 132–146)
SODIUM SERPL-SCNC: 122 MMOL/L (ref 132–146)
SODIUM SERPL-SCNC: 123 MMOL/L (ref 132–146)
SP GR UR STRIP: <1.005 (ref 1–1.03)
TEST INFORMATION: NORMAL
TME LAST DOSE: ABNORMAL H
TOXIC TRICYCLIC SC,BLOOD: NEGATIVE
TROPONIN I SERPL HS-MCNC: 145 NG/L (ref 0–11)
TROPONIN I SERPL HS-MCNC: 48 NG/L (ref 0–11)
TROPONIN I SERPL HS-MCNC: 95 NG/L (ref 0–11)
UROBILINOGEN UR STRIP-ACNC: 0.2 EU/DL (ref 0–1)
VALPROATE SERPL-MCNC: <3 UG/ML (ref 50–100)
VANCOMYCIN DOSE: ABNORMAL MG
WBC #/AREA URNS HPF: ABNORMAL /HPF
WBC OTHER # BLD: 18.2 K/UL (ref 4.5–11.5)

## 2024-11-12 PROCEDURE — 81001 URINALYSIS AUTO W/SCOPE: CPT

## 2024-11-12 PROCEDURE — 2140000000 HC CCU INTERMEDIATE R&B

## 2024-11-12 PROCEDURE — 83735 ASSAY OF MAGNESIUM: CPT

## 2024-11-12 PROCEDURE — 2500000003 HC RX 250 WO HCPCS: Performed by: EMERGENCY MEDICINE

## 2024-11-12 PROCEDURE — 80164 ASSAY DIPROPYLACETIC ACD TOT: CPT

## 2024-11-12 PROCEDURE — 84295 ASSAY OF SERUM SODIUM: CPT

## 2024-11-12 PROCEDURE — 85025 COMPLETE CBC W/AUTO DIFF WBC: CPT

## 2024-11-12 PROCEDURE — 80179 DRUG ASSAY SALICYLATE: CPT

## 2024-11-12 PROCEDURE — 99285 EMERGENCY DEPT VISIT HI MDM: CPT

## 2024-11-12 PROCEDURE — 71045 X-RAY EXAM CHEST 1 VIEW: CPT

## 2024-11-12 PROCEDURE — 80053 COMPREHEN METABOLIC PANEL: CPT

## 2024-11-12 PROCEDURE — 96374 THER/PROPH/DIAG INJ IV PUSH: CPT

## 2024-11-12 PROCEDURE — 6360000002 HC RX W HCPCS: Performed by: INTERNAL MEDICINE

## 2024-11-12 PROCEDURE — G0480 DRUG TEST DEF 1-7 CLASSES: HCPCS

## 2024-11-12 PROCEDURE — 70450 CT HEAD/BRAIN W/O DYE: CPT

## 2024-11-12 PROCEDURE — 96365 THER/PROPH/DIAG IV INF INIT: CPT

## 2024-11-12 PROCEDURE — 83605 ASSAY OF LACTIC ACID: CPT

## 2024-11-12 PROCEDURE — 2580000003 HC RX 258: Performed by: EMERGENCY MEDICINE

## 2024-11-12 PROCEDURE — 82248 BILIRUBIN DIRECT: CPT

## 2024-11-12 PROCEDURE — 80307 DRUG TEST PRSMV CHEM ANLYZR: CPT

## 2024-11-12 PROCEDURE — 2580000003 HC RX 258

## 2024-11-12 PROCEDURE — 87040 BLOOD CULTURE FOR BACTERIA: CPT

## 2024-11-12 PROCEDURE — 96372 THER/PROPH/DIAG INJ SC/IM: CPT

## 2024-11-12 PROCEDURE — 84484 ASSAY OF TROPONIN QUANT: CPT

## 2024-11-12 PROCEDURE — 93005 ELECTROCARDIOGRAM TRACING: CPT

## 2024-11-12 PROCEDURE — 82140 ASSAY OF AMMONIA: CPT

## 2024-11-12 PROCEDURE — 83690 ASSAY OF LIPASE: CPT

## 2024-11-12 PROCEDURE — 85610 PROTHROMBIN TIME: CPT

## 2024-11-12 PROCEDURE — 96375 TX/PRO/DX INJ NEW DRUG ADDON: CPT

## 2024-11-12 PROCEDURE — 6360000002 HC RX W HCPCS: Performed by: EMERGENCY MEDICINE

## 2024-11-12 PROCEDURE — 2580000003 HC RX 258: Performed by: INTERNAL MEDICINE

## 2024-11-12 PROCEDURE — 80048 BASIC METABOLIC PNL TOTAL CA: CPT

## 2024-11-12 PROCEDURE — 80143 DRUG ASSAY ACETAMINOPHEN: CPT

## 2024-11-12 RX ORDER — LORAZEPAM 1 MG/1
2 TABLET ORAL
Status: DISPENSED | OUTPATIENT
Start: 2024-11-12

## 2024-11-12 RX ORDER — MIDAZOLAM HYDROCHLORIDE 2 MG/2ML
2 INJECTION, SOLUTION INTRAMUSCULAR; INTRAVENOUS ONCE
Status: COMPLETED | OUTPATIENT
Start: 2024-11-12 | End: 2024-11-12

## 2024-11-12 RX ORDER — LORAZEPAM 1 MG/1
3 TABLET ORAL
Status: ACTIVE | OUTPATIENT
Start: 2024-11-12

## 2024-11-12 RX ORDER — ONDANSETRON 2 MG/ML
4 INJECTION INTRAMUSCULAR; INTRAVENOUS EVERY 6 HOURS PRN
Status: DISPENSED | OUTPATIENT
Start: 2024-11-12

## 2024-11-12 RX ORDER — THIAMINE HYDROCHLORIDE 100 MG/ML
500 INJECTION, SOLUTION INTRAMUSCULAR; INTRAVENOUS EVERY 8 HOURS
Status: COMPLETED | OUTPATIENT
Start: 2024-11-12 | End: 2024-11-14

## 2024-11-12 RX ORDER — SODIUM CHLORIDE 9 MG/ML
INJECTION, SOLUTION INTRAVENOUS PRN
Status: DISCONTINUED | OUTPATIENT
Start: 2024-11-12 | End: 2024-11-12 | Stop reason: SDUPTHER

## 2024-11-12 RX ORDER — MAGNESIUM SULFATE IN WATER 40 MG/ML
2000 INJECTION, SOLUTION INTRAVENOUS PRN
Status: ACTIVE | OUTPATIENT
Start: 2024-11-12

## 2024-11-12 RX ORDER — LEVETIRACETAM 500 MG/5ML
1500 INJECTION, SOLUTION, CONCENTRATE INTRAVENOUS ONCE
Status: COMPLETED | OUTPATIENT
Start: 2024-11-12 | End: 2024-11-12

## 2024-11-12 RX ORDER — ACETAMINOPHEN 325 MG/1
650 TABLET ORAL EVERY 6 HOURS PRN
Status: ACTIVE | OUTPATIENT
Start: 2024-11-12

## 2024-11-12 RX ORDER — POTASSIUM CHLORIDE 7.45 MG/ML
10 INJECTION INTRAVENOUS PRN
Status: ACTIVE | OUTPATIENT
Start: 2024-11-12

## 2024-11-12 RX ORDER — LORAZEPAM 2 MG/ML
3 INJECTION INTRAMUSCULAR
Status: DISPENSED | OUTPATIENT
Start: 2024-11-12

## 2024-11-12 RX ORDER — SODIUM CHLORIDE 0.9 % (FLUSH) 0.9 %
5-40 SYRINGE (ML) INJECTION EVERY 12 HOURS SCHEDULED
Status: DISPENSED | OUTPATIENT
Start: 2024-11-12

## 2024-11-12 RX ORDER — LORAZEPAM 1 MG/1
4 TABLET ORAL
Status: ACTIVE | OUTPATIENT
Start: 2024-11-12

## 2024-11-12 RX ORDER — ENOXAPARIN SODIUM 100 MG/ML
40 INJECTION SUBCUTANEOUS DAILY
Status: DISPENSED | OUTPATIENT
Start: 2024-11-13

## 2024-11-12 RX ORDER — SODIUM CHLORIDE 9 MG/ML
INJECTION, SOLUTION INTRAVENOUS CONTINUOUS
Status: DISCONTINUED | OUTPATIENT
Start: 2024-11-12 | End: 2024-11-14

## 2024-11-12 RX ORDER — SODIUM CHLORIDE 0.9 % (FLUSH) 0.9 %
5-40 SYRINGE (ML) INJECTION PRN
Status: ACTIVE | OUTPATIENT
Start: 2024-11-12

## 2024-11-12 RX ORDER — SODIUM CHLORIDE 9 MG/ML
INJECTION, SOLUTION INTRAVENOUS PRN
Status: ACTIVE | OUTPATIENT
Start: 2024-11-12

## 2024-11-12 RX ORDER — ATORVASTATIN CALCIUM 40 MG/1
40 TABLET, FILM COATED ORAL DAILY
Status: DISPENSED | OUTPATIENT
Start: 2024-11-13

## 2024-11-12 RX ORDER — LORAZEPAM 2 MG/ML
2 INJECTION INTRAMUSCULAR
Status: DISPENSED | OUTPATIENT
Start: 2024-11-12

## 2024-11-12 RX ORDER — LORAZEPAM 2 MG/ML
1 INJECTION INTRAMUSCULAR
Status: ACTIVE | OUTPATIENT
Start: 2024-11-12

## 2024-11-12 RX ORDER — POTASSIUM CHLORIDE 1500 MG/1
40 TABLET, EXTENDED RELEASE ORAL PRN
Status: ACTIVE | OUTPATIENT
Start: 2024-11-12

## 2024-11-12 RX ORDER — ACETAMINOPHEN 650 MG/1
650 SUPPOSITORY RECTAL EVERY 6 HOURS PRN
Status: ACTIVE | OUTPATIENT
Start: 2024-11-12

## 2024-11-12 RX ORDER — LANOLIN ALCOHOL/MO/W.PET/CERES
100 CREAM (GRAM) TOPICAL DAILY
Status: ACTIVE | OUTPATIENT
Start: 2024-11-19

## 2024-11-12 RX ORDER — FOLIC ACID 1 MG/1
1 TABLET ORAL DAILY
Status: DISPENSED | OUTPATIENT
Start: 2024-11-13

## 2024-11-12 RX ORDER — LORAZEPAM 1 MG/1
1 TABLET ORAL
Status: DISPENSED | OUTPATIENT
Start: 2024-11-12

## 2024-11-12 RX ORDER — THIAMINE HYDROCHLORIDE 100 MG/ML
250 INJECTION, SOLUTION INTRAMUSCULAR; INTRAVENOUS EVERY 24 HOURS
Status: DISPENSED | OUTPATIENT
Start: 2024-11-14 | End: 2024-11-19

## 2024-11-12 RX ORDER — ONDANSETRON 4 MG/1
4 TABLET, ORALLY DISINTEGRATING ORAL EVERY 8 HOURS PRN
Status: DISPENSED | OUTPATIENT
Start: 2024-11-12

## 2024-11-12 RX ORDER — DROPERIDOL 2.5 MG/ML
5 INJECTION, SOLUTION INTRAMUSCULAR; INTRAVENOUS ONCE
Status: COMPLETED | OUTPATIENT
Start: 2024-11-12 | End: 2024-11-12

## 2024-11-12 RX ORDER — GAUZE BANDAGE 2" X 2"
50 BANDAGE TOPICAL DAILY
Status: DISCONTINUED | OUTPATIENT
Start: 2024-11-13 | End: 2024-11-12 | Stop reason: SDUPTHER

## 2024-11-12 RX ORDER — DIVALPROEX SODIUM 125 MG/1
125 CAPSULE, COATED PELLETS ORAL 2 TIMES DAILY
Status: DISCONTINUED | OUTPATIENT
Start: 2024-11-13 | End: 2024-11-14

## 2024-11-12 RX ORDER — LORAZEPAM 2 MG/ML
4 INJECTION INTRAMUSCULAR
Status: DISPENSED | OUTPATIENT
Start: 2024-11-12

## 2024-11-12 RX ORDER — SODIUM CHLORIDE 0.9 % (FLUSH) 0.9 %
5-40 SYRINGE (ML) INJECTION EVERY 12 HOURS SCHEDULED
Status: ACTIVE | OUTPATIENT
Start: 2024-11-12

## 2024-11-12 RX ORDER — MAGNESIUM SULFATE IN WATER 40 MG/ML
2000 INJECTION, SOLUTION INTRAVENOUS ONCE
Status: COMPLETED | OUTPATIENT
Start: 2024-11-12 | End: 2024-11-13

## 2024-11-12 RX ORDER — POLYETHYLENE GLYCOL 3350 17 G/17G
17 POWDER, FOR SOLUTION ORAL DAILY PRN
Status: ACTIVE | OUTPATIENT
Start: 2024-11-12

## 2024-11-12 RX ORDER — METOPROLOL TARTRATE 25 MG/1
25 TABLET, FILM COATED ORAL 2 TIMES DAILY
Status: DISPENSED | OUTPATIENT
Start: 2024-11-13

## 2024-11-12 RX ORDER — MULTIVITAMIN WITH IRON
1 TABLET ORAL DAILY
Status: DISPENSED | OUTPATIENT
Start: 2024-11-13

## 2024-11-12 RX ADMIN — SODIUM CHLORIDE, PRESERVATIVE FREE 10 ML: 5 INJECTION INTRAVENOUS at 22:36

## 2024-11-12 RX ADMIN — DEXMEDETOMIDINE 0.4 MCG/KG/HR: 200 INJECTION, SOLUTION INTRAVENOUS at 21:14

## 2024-11-12 RX ADMIN — MAGNESIUM SULFATE HEPTAHYDRATE 2000 MG: 40 INJECTION, SOLUTION INTRAVENOUS at 22:36

## 2024-11-12 RX ADMIN — DROPERIDOL 5 MG: 2.5 INJECTION, SOLUTION INTRAMUSCULAR; INTRAVENOUS at 19:32

## 2024-11-12 RX ADMIN — SODIUM CHLORIDE: 9 INJECTION, SOLUTION INTRAVENOUS at 23:04

## 2024-11-12 RX ADMIN — LEVETIRACETAM 1500 MG: 100 INJECTION INTRAVENOUS at 20:43

## 2024-11-12 RX ADMIN — MIDAZOLAM 2 MG: 1 INJECTION INTRAMUSCULAR; INTRAVENOUS at 19:11

## 2024-11-12 RX ADMIN — THIAMINE HYDROCHLORIDE 500 MG: 100 INJECTION, SOLUTION INTRAMUSCULAR; INTRAVENOUS at 22:33

## 2024-11-12 RX ADMIN — MIDAZOLAM 2 MG: 1 INJECTION INTRAMUSCULAR; INTRAVENOUS at 20:43

## 2024-11-12 ASSESSMENT — PAIN - FUNCTIONAL ASSESSMENT: PAIN_FUNCTIONAL_ASSESSMENT: NONE - DENIES PAIN

## 2024-11-13 ENCOUNTER — APPOINTMENT (OUTPATIENT)
Age: 64
DRG: 640 | End: 2024-11-13
Attending: INTERNAL MEDICINE
Payer: MEDICARE

## 2024-11-13 PROBLEM — R79.89 ELEVATED TROPONIN: Status: ACTIVE | Noted: 2024-11-13

## 2024-11-13 PROBLEM — E87.1 HYPONATREMIA: Status: ACTIVE | Noted: 2024-11-13

## 2024-11-13 LAB
ALBUMIN SERPL-MCNC: 4.2 G/DL (ref 3.5–5.2)
ALP SERPL-CCNC: 87 U/L (ref 40–129)
ALT SERPL-CCNC: 24 U/L (ref 0–40)
ANION GAP SERPL CALCULATED.3IONS-SCNC: 15 MMOL/L (ref 7–16)
AST SERPL-CCNC: 75 U/L (ref 0–39)
BASOPHILS # BLD: 0.02 K/UL (ref 0–0.2)
BASOPHILS NFR BLD: 0 % (ref 0–2)
BILIRUB DIRECT SERPL-MCNC: <0.2 MG/DL (ref 0–0.3)
BILIRUB INDIRECT SERPL-MCNC: ABNORMAL MG/DL (ref 0–1)
BILIRUB SERPL-MCNC: 0.7 MG/DL (ref 0–1.2)
BUN SERPL-MCNC: 13 MG/DL (ref 6–23)
CALCIUM SERPL-MCNC: 8.8 MG/DL (ref 8.6–10.2)
CHLORIDE SERPL-SCNC: 92 MMOL/L (ref 98–107)
CHLORIDE UR-SCNC: <20 MMOL/L
CK SERPL-CCNC: 5159 U/L (ref 20–200)
CO2 SERPL-SCNC: 18 MMOL/L (ref 22–29)
CREAT SERPL-MCNC: 1.4 MG/DL (ref 0.7–1.2)
EOSINOPHIL # BLD: 0.02 K/UL (ref 0.05–0.5)
EOSINOPHILS RELATIVE PERCENT: 0 % (ref 0–6)
ERYTHROCYTE [DISTWIDTH] IN BLOOD BY AUTOMATED COUNT: 12.8 % (ref 11.5–15)
FOLATE SERPL-MCNC: 13.5 NG/ML (ref 4.8–24.2)
GFR, ESTIMATED: 54 ML/MIN/1.73M2
GLUCOSE BLD-MCNC: 93 MG/DL (ref 74–99)
GLUCOSE SERPL-MCNC: 80 MG/DL (ref 74–99)
HCT VFR BLD AUTO: 41.2 % (ref 37–54)
HGB BLD-MCNC: 14 G/DL (ref 12.5–16.5)
IMM GRANULOCYTES # BLD AUTO: 0.04 K/UL (ref 0–0.58)
IMM GRANULOCYTES NFR BLD: 0 % (ref 0–5)
INR PPP: 1.1
LACTATE BLDV-SCNC: 3.1 MMOL/L (ref 0.5–2.2)
LYMPHOCYTES NFR BLD: 1.45 K/UL (ref 1.5–4)
LYMPHOCYTES RELATIVE PERCENT: 12 % (ref 20–42)
MCH RBC QN AUTO: 29.9 PG (ref 26–35)
MCHC RBC AUTO-ENTMCNC: 34 G/DL (ref 32–34.5)
MCV RBC AUTO: 87.8 FL (ref 80–99.9)
MONOCYTES NFR BLD: 0.86 K/UL (ref 0.1–0.95)
MONOCYTES NFR BLD: 7 % (ref 2–12)
NEUTROPHILS NFR BLD: 80 % (ref 43–80)
NEUTS SEG NFR BLD: 9.53 K/UL (ref 1.8–7.3)
OSMOLALITY SERPL: 264 MOSM/KG (ref 285–310)
OSMOLALITY UR: 97 MOSM/KG (ref 300–900)
PLATELET # BLD AUTO: 292 K/UL (ref 130–450)
PMV BLD AUTO: 9.4 FL (ref 7–12)
POTASSIUM SERPL-SCNC: 4.2 MMOL/L (ref 3.5–5)
PROT SERPL-MCNC: 7 G/DL (ref 6.4–8.3)
PROTHROMBIN TIME: 11.5 SEC (ref 9.3–12.4)
RBC # BLD AUTO: 4.69 M/UL (ref 3.8–5.8)
SODIUM SERPL-SCNC: 122 MMOL/L (ref 132–146)
SODIUM SERPL-SCNC: 125 MMOL/L (ref 132–146)
SODIUM UR-SCNC: <20 MMOL/L
TSH SERPL DL<=0.05 MIU/L-ACNC: 3.43 UIU/ML (ref 0.27–4.2)
VIT B12 SERPL-MCNC: 501 PG/ML (ref 211–946)
WBC OTHER # BLD: 11.9 K/UL (ref 4.5–11.5)

## 2024-11-13 PROCEDURE — 82436 ASSAY OF URINE CHLORIDE: CPT

## 2024-11-13 PROCEDURE — 84443 ASSAY THYROID STIM HORMONE: CPT

## 2024-11-13 PROCEDURE — 2500000003 HC RX 250 WO HCPCS: Performed by: INTERNAL MEDICINE

## 2024-11-13 PROCEDURE — 6360000002 HC RX W HCPCS: Performed by: INTERNAL MEDICINE

## 2024-11-13 PROCEDURE — 93306 TTE W/DOPPLER COMPLETE: CPT

## 2024-11-13 PROCEDURE — 82746 ASSAY OF FOLIC ACID SERUM: CPT

## 2024-11-13 PROCEDURE — 2580000003 HC RX 258

## 2024-11-13 PROCEDURE — 82607 VITAMIN B-12: CPT

## 2024-11-13 PROCEDURE — 84295 ASSAY OF SERUM SODIUM: CPT

## 2024-11-13 PROCEDURE — 85610 PROTHROMBIN TIME: CPT

## 2024-11-13 PROCEDURE — 80053 COMPREHEN METABOLIC PANEL: CPT

## 2024-11-13 PROCEDURE — 84484 ASSAY OF TROPONIN QUANT: CPT

## 2024-11-13 PROCEDURE — 99232 SBSQ HOSP IP/OBS MODERATE 35: CPT | Performed by: INTERNAL MEDICINE

## 2024-11-13 PROCEDURE — 2580000003 HC RX 258: Performed by: INTERNAL MEDICINE

## 2024-11-13 PROCEDURE — 82248 BILIRUBIN DIRECT: CPT

## 2024-11-13 PROCEDURE — 85025 COMPLETE CBC W/AUTO DIFF WBC: CPT

## 2024-11-13 PROCEDURE — 99222 1ST HOSP IP/OBS MODERATE 55: CPT | Performed by: PSYCHIATRY & NEUROLOGY

## 2024-11-13 PROCEDURE — 36415 COLL VENOUS BLD VENIPUNCTURE: CPT

## 2024-11-13 PROCEDURE — 99223 1ST HOSP IP/OBS HIGH 75: CPT | Performed by: INTERNAL MEDICINE

## 2024-11-13 PROCEDURE — 82550 ASSAY OF CK (CPK): CPT

## 2024-11-13 PROCEDURE — 80048 BASIC METABOLIC PNL TOTAL CA: CPT

## 2024-11-13 PROCEDURE — 2500000003 HC RX 250 WO HCPCS: Performed by: EMERGENCY MEDICINE

## 2024-11-13 PROCEDURE — 2140000000 HC CCU INTERMEDIATE R&B

## 2024-11-13 PROCEDURE — APPSS60 APP SPLIT SHARED TIME 46-60 MINUTES

## 2024-11-13 PROCEDURE — 83605 ASSAY OF LACTIC ACID: CPT

## 2024-11-13 PROCEDURE — 84300 ASSAY OF URINE SODIUM: CPT

## 2024-11-13 PROCEDURE — 83935 ASSAY OF URINE OSMOLALITY: CPT

## 2024-11-13 PROCEDURE — 82962 GLUCOSE BLOOD TEST: CPT

## 2024-11-13 PROCEDURE — 83930 ASSAY OF BLOOD OSMOLALITY: CPT

## 2024-11-13 RX ORDER — METOPROLOL TARTRATE 1 MG/ML
2.5 INJECTION, SOLUTION INTRAVENOUS EVERY 4 HOURS
Status: DISCONTINUED | OUTPATIENT
Start: 2024-11-13 | End: 2024-11-14

## 2024-11-13 RX ORDER — HYDRALAZINE HYDROCHLORIDE 20 MG/ML
10 INJECTION INTRAMUSCULAR; INTRAVENOUS EVERY 6 HOURS PRN
Status: ACTIVE | OUTPATIENT
Start: 2024-11-13

## 2024-11-13 RX ORDER — LORAZEPAM 2 MG/ML
2 INJECTION INTRAMUSCULAR ONCE
Status: DISCONTINUED | OUTPATIENT
Start: 2024-11-13 | End: 2024-11-13

## 2024-11-13 RX ADMIN — SODIUM CHLORIDE: 9 INJECTION, SOLUTION INTRAVENOUS at 05:40

## 2024-11-13 RX ADMIN — LORAZEPAM 3 MG: 2 INJECTION INTRAMUSCULAR; INTRAVENOUS at 12:04

## 2024-11-13 RX ADMIN — SODIUM CHLORIDE: 9 INJECTION, SOLUTION INTRAVENOUS at 21:54

## 2024-11-13 RX ADMIN — LORAZEPAM 2 MG: 2 INJECTION INTRAMUSCULAR; INTRAVENOUS at 00:40

## 2024-11-13 RX ADMIN — LORAZEPAM 3 MG: 2 INJECTION INTRAMUSCULAR; INTRAVENOUS at 06:09

## 2024-11-13 RX ADMIN — THIAMINE HYDROCHLORIDE 500 MG: 100 INJECTION, SOLUTION INTRAMUSCULAR; INTRAVENOUS at 14:18

## 2024-11-13 RX ADMIN — VALPROATE SODIUM 125 MG: 100 INJECTION, SOLUTION INTRAVENOUS at 11:55

## 2024-11-13 RX ADMIN — METOPROLOL TARTRATE 2.5 MG: 1 INJECTION, SOLUTION INTRAVENOUS at 14:19

## 2024-11-13 RX ADMIN — METOPROLOL TARTRATE 2.5 MG: 1 INJECTION, SOLUTION INTRAVENOUS at 18:23

## 2024-11-13 RX ADMIN — SODIUM CHLORIDE, PRESERVATIVE FREE 10 ML: 5 INJECTION INTRAVENOUS at 10:19

## 2024-11-13 RX ADMIN — ENOXAPARIN SODIUM 40 MG: 100 INJECTION SUBCUTANEOUS at 10:18

## 2024-11-13 RX ADMIN — METOPROLOL TARTRATE 2.5 MG: 1 INJECTION, SOLUTION INTRAVENOUS at 21:35

## 2024-11-13 RX ADMIN — Medication 55 MG: at 02:15

## 2024-11-13 RX ADMIN — ONDANSETRON 4 MG: 2 INJECTION INTRAMUSCULAR; INTRAVENOUS at 08:36

## 2024-11-13 RX ADMIN — LORAZEPAM 3 MG: 2 INJECTION INTRAMUSCULAR; INTRAVENOUS at 03:25

## 2024-11-13 RX ADMIN — SODIUM CHLORIDE, PRESERVATIVE FREE 10 ML: 5 INJECTION INTRAVENOUS at 21:36

## 2024-11-13 RX ADMIN — THIAMINE HYDROCHLORIDE 500 MG: 100 INJECTION, SOLUTION INTRAMUSCULAR; INTRAVENOUS at 05:36

## 2024-11-13 RX ADMIN — THIAMINE HYDROCHLORIDE 500 MG: 100 INJECTION, SOLUTION INTRAMUSCULAR; INTRAVENOUS at 21:32

## 2024-11-13 RX ADMIN — VALPROATE SODIUM 125 MG: 100 INJECTION, SOLUTION INTRAVENOUS at 23:43

## 2024-11-13 RX ADMIN — SODIUM CHLORIDE, PRESERVATIVE FREE 10 ML: 5 INJECTION INTRAVENOUS at 21:35

## 2024-11-13 RX ADMIN — Medication 55 MG: at 00:07

## 2024-11-13 RX ADMIN — LORAZEPAM 4 MG: 2 INJECTION INTRAMUSCULAR; INTRAVENOUS at 08:38

## 2024-11-13 RX ADMIN — PANTOPRAZOLE SODIUM 40 MG: 40 INJECTION, POWDER, FOR SOLUTION INTRAVENOUS at 12:05

## 2024-11-13 NOTE — PROGRESS NOTES
Received a phone call from patient's sister, Jazlyn, in regards to update on the patient's condition. Notified Jazlyn that she is not on the patient's chart and no information can be shared. Jazlyn states her brother is on the contact list and they are not on speaking terms. Patient is not alert and oriented to consent to information being shared with Jazlyn.    CAMACHO GREY RN

## 2024-11-13 NOTE — PROGRESS NOTES
Before pt arrived to the unit, this RN spoke with clinical supervisor in regards to pt's clinical status and if the pt needed to be on a higher level of care. Per supervisor, pt was appropriate for intermediate level of care at this time.

## 2024-11-13 NOTE — CONSULTS
[facility]  Neurology Consult    Date:  11/13/2024  Patient Name:  Jason Fisher  YOB: 1960  MRN: [id number]     PCP:  Wes Lala MD   Referring:  No ref. provider found      Chief Complaint: AMS    History obtained from: chart review    Assessment  Jason Fisher is a 64 y.o. male with acute encephalopathy manifesting as confusion (oriented to self but not to place or time) likely secondary to hyponatremia vs wernicke's encephalopathy, will obtain MRI for further characterization    History of seizures in the past in the context of hyponatremia and alcohol withdrawal. No EEG on file. Will obtain EEG.      Plan  MRI, EEG  Continue high dose thiamine  Continue CIWA protocol  Hypernatremia management per nephrology        History of Present Illness:  Jason Fisher is a 64 y.o. right handed male with past medical history of chronic alcoholism, chronic hyponatremia, seizure disorder, psychogenic polydipsia, anxiety, CAD status post CABG, hypertension, hyperlipidemia, dementia, COPD presenting for evaluation of AMS. Initally at the ED patient was agitated, did not follow commands. Reportedly brother call EMS after patient was found confused.  Brother reported no falls, head trauma, chest pain, nausea vomiting diarrhea. Reportedly patient excessively drinks water, and alcohol.  No seizure activity noted.  Patient had recent hospitalization in September 2024 for seizure activity with hyponatremia.    Initial lab work showed sodium 121>122>123>125, serum osm 264, lactic acid 4.6>3.1.  WBCs 18>11.9, Mg 1.5, troponin 48>95>145 EKG no ST changes, CXR unremarkable,  UDS and SDS negative, patient was started on Precedex and was given Keppra 1500 mg p.o.  CT head showed no acute intracranial abnormality.  Unchanged chronic bifrontal encephalomalacia left greater than right.  Valproate level less than 3    Pt agitated given midazolam 2 x2, droperidol 5mg, ketamine 55mg x2,

## 2024-11-13 NOTE — ED PROVIDER NOTES
daily       ALLERGIES     Haldol [haloperidol]    FAMILYHISTORY     No family history on file.     SOCIAL HISTORY       Social History     Tobacco Use    Smoking status: Never    Smokeless tobacco: Former   Vaping Use    Vaping status: Never Used   Substance Use Topics    Alcohol use: Not Currently    Drug use: Not Currently       SCREENINGS                         CIWA Assessment  BP: (!) 190/158  Pulse: 92           PHYSICAL EXAM  1 or more Elements     ED Triage Vitals [11/12/24 1755]   BP Systolic BP Percentile Diastolic BP Percentile Temp Temp Source Pulse Respirations SpO2   (!) 190/158 -- -- 97.9 °F (36.6 °C) Axillary 92 16 96 %      Height Weight         -- --             Constitutional/General: Eyes open, confused, follows some commands  Head: Normocephalic and atraumatic  Eyes: PERRL, EOMI, conjunctiva normal, sclera non icteric  ENT:  Oropharynx clear, handling secretions, no trismus, no asymmetry of the posterior oropharynx or uvular edema  Neck: Supple, full ROM, no stridor, no meningeal signs  Respiratory: Lungs clear to auscultation bilaterally, no wheezes, rales, or rhonchi. Not in respiratory distress  Cardiovascular:  Regular rate. Regular rhythm. No murmurs, no gallops, no rubs. 2+ distal pulses. Equal extremity pulses.   Chest: No chest wall tenderness  GI:  Abdomen Soft, Non tender, Non distended.  No rebound, guarding, or rigidity. No pulsatile masses.  Musculoskeletal: Moves all extremities x 4. Warm and well perfused, no clubbing, no cyanosis, no edema. Capillary refill <3 seconds  Integument: skin warm and dry. No rashes.   Neurologic: GCS 14, no focal deficits, symmetric strength 5/5 in the upper and lower extremities bilaterally  Psychiatric: Normal Affect      DIAGNOSTIC RESULTS   LABS: Interpreted by Eliane Pineda DO    Labs Reviewed   URINALYSIS WITH MICROSCOPIC - Abnormal; Notable for the following components:       Result Value    Specific Gravity, UA <1.005 (*)     Urine Hgb MODERATE  HISTORY: Shortness of Breath TECHNOLOGIST PROVIDED HISTORY: Reason for exam:->Shortness of Breath FINDINGS: The lungs are without acute focal process.  There is no effusion or pneumothorax.  Stable heart size.  The osseous structures are without acute process.  Plate and screw fixation of the left clavicle.     No acute process.       No results found.      PAST MEDICAL HISTORY/Chronic Conditions Affecting Care      has no past medical history on file.     EMERGENCY DEPARTMENT COURSE    Vitals:    Vitals:    11/17/24 2332 11/18/24 0400 11/18/24 0800 11/18/24 1138   BP: 104/74 112/78 114/72 133/81   Pulse: 64 66 70 76   Resp: 18 18 18   Temp: 98.2 °F (36.8 °C) 98.2 °F (36.8 °C) 98.1 °F (36.7 °C) 98 °F (36.7 °C)   TempSrc: Oral Oral Oral Oral   SpO2: 97% 96% 96% 98%   Weight:       Height:           Patient was given the following medications:  Medications   midazolam PF (VERSED) injection 2 mg (2 mg IntraVENous Given 11/12/24 1911)   droPERidol (INAPSINE) injection 5 mg (5 mg IntraMUSCular Given 11/12/24 1932)   levETIRAcetam (KEPPRA) injection 1,500 mg (1,500 mg IntraVENous Given 11/12/24 2043)   midazolam PF (VERSED) injection 2 mg (2 mg IntraVENous Given 11/12/24 2043)   magnesium sulfate 2000 mg in 50 mL IVPB premix (0 mg IntraVENous Stopped 11/13/24 0006)   thiamine (B-1) injection 500 mg (500 mg IntraVENous Given 11/14/24 1323)   ketamine (KETALAR) injection 55 mg (55 mg IntraVENous Given 11/13/24 0007)   ketamine (KETALAR) injection 55 mg (55 mg IntraVENous Given 11/13/24 0215)         Is this patient to be included in the SEP-1 Core Measure due to severe sepsis or septic shock?   No   Exclusion criteria - the patient is NOT to be included for SEP-1 Core Measure due to:  Infection is not suspected          Medical Decision Making/Differential Diagnosis:    CC/HPI Summary, Social Determinants of health, Records Reviewed, DDx, testing done/not done, ED Course, Reassessment, disposition considerations/shared

## 2024-11-13 NOTE — ED NOTES
Patient was seen by prior ED physician.  Did review chart.  Patient admitted for hyponatremia.  Patient was reportedly on Precedex drip.  It was stopped due to hypotension.  I did assess the patient.  Rectal temp was 99 he does have elevated white count of 18,000 we did speak to nephrology in regards to patient's hyponatremia.  And IV fluids were ordered on patient.  Patient blood pressure was low this is in the 60s.  He did improve here and I suspect the blood pressure did drop related to medication and not related to sepsis repeat lactic acid noted as well.  Patient was ketamine due to agitation.  Patient was yelling out for his brother Sai.  Patient also ordered Ativan.  Patient rechecked blood pressure systolic over 100.  Patient is admitted to intermediate bed.     aBr Cason MD  11/13/24 0106

## 2024-11-13 NOTE — PLAN OF CARE
Problem: Safety - Medical Restraint  Goal: Remains free of injury from restraints (Restraint for Interference with Medical Device)  Description: INTERVENTIONS:  1. Determine that other, less restrictive measures have been tried or would not be effective before applying the restraint  2. Evaluate the patient's condition at the time of restraint application  3. Inform patient/family regarding the reason for restraint  4. Q2H: Monitor safety, psychosocial status, comfort, nutrition and hydration  Outcome: Progressing     Problem: Discharge Planning  Goal: Discharge to home or other facility with appropriate resources  Outcome: Progressing     Problem: Pain  Goal: Verbalizes/displays adequate comfort level or baseline comfort level  Outcome: Progressing     Problem: Safety - Adult  Goal: Free from fall injury  Outcome: Progressing     Problem: Skin/Tissue Integrity  Goal: Absence of new skin breakdown  Description: 1.  Monitor for areas of redness and/or skin breakdown  2.  Assess vascular access sites hourly  3.  Every 4-6 hours minimum:  Change oxygen saturation probe site  4.  Every 4-6 hours:  If on nasal continuous positive airway pressure, respiratory therapy assess nares and determine need for appliance change or resting period.  Outcome: Progressing     Problem: ABCDS Injury Assessment  Goal: Absence of physical injury  Outcome: Progressing     Problem: Confusion  Goal: Confusion, delirium, dementia, or psychosis is improved or at baseline  Description: INTERVENTIONS:  1. Assess for possible contributors to thought disturbance, including medications, impaired vision or hearing, underlying metabolic abnormalities, dehydration, psychiatric diagnoses, and notify attending LIP  2. [place] high risk fall precautions, as indicated  3. Provide frequent short contacts to provide reality reorientation, refocusing and direction  4. Decrease environmental stimuli, including noise as appropriate  5. Monitor and

## 2024-11-13 NOTE — PROGRESS NOTES
Lab called concerning noon BMP results, was reported that the lab has not been collected and he will send someone up to collect.

## 2024-11-13 NOTE — ED NOTES
Patient combative, fighting with staff.  Yelling out for his brother Deep.  Dr. Cason orderded bilateral restraints.  Given ketamine, restraints started.  Patient still fighting and trying to get out of bed.

## 2024-11-13 NOTE — PROGRESS NOTES
Patient's sister has called a number of times for information concerning patient. She is not listed as a  and patient is unable to give consent to give information at this time due to his mentation.

## 2024-11-13 NOTE — PROGRESS NOTES
Patient is restless and not following commands, he is in bilateral wrist restraints. Request made to Dr. Mann to change oral meds to IV until patient's mentation improves.

## 2024-11-13 NOTE — PROGRESS NOTES
4 Eyes Skin Assessment     NAME:  Jason Fisher  YOB: 1960  MEDICAL RECORD NUMBER:  [id number]    The patient is being assessed for  Admission    I agree that at least one RN has performed a thorough Head to Toe Skin Assessment on the patient. ALL assessment sites listed below have been assessed.      Areas assessed by both nurses:    Head, Face, Ears, Shoulders, Back, Chest, Arms, Elbows, Hands, Sacrum. Buttock, Coccyx, Ischium, Legs. Feet and Heels, and Under Medical Devices         Does the Patient have a Wound? No noted wound(s)       [place] Prevention initiated by RN: Yes  Wound Care Orders initiated by RN: No    Pressure Injury (Stage 3,4, Unstageable, DTI, NWPT, and Complex wounds) if present, place Wound referral order by RN under : No    New Ostomies, if present place, Ostomy referral order under : No     Nurse 1 eSignature: Electronically signed by Ana Khan RN on 11/13/24 at 3:59 AM EST    **SHARE this note so that the co-signing nurse can place an eSignature**    Nurse 2 eSignature: Electronically signed by Delphine Edwards RN on 11/13/24 at 4:51 AM EST

## 2024-11-13 NOTE — CONSULTS
separately obtained the history and performed the physical exam.  I personally reviewed all of the above labs, imaging, history, past medical history, social history, family history, surgical history, medications, review of systems, and data.  I reviewed available records.  All of the assessments and recommendations are personally from me.  I personally counseled and educated the patient and coordinated care with other healthcare professionals as needed.  I communicated the above and results to patient's family/caregiver if asked or needed.  All of the above cardiac medical decisions are personally from me.  Please see my additional contributions to the history, physical exam, assessment, and recommendations below.      History of chief complaint:  He was sedated.  He was laying on his left side.  He was in no distress.  He was restrained.  He was spontaneously moving all extremities but he was not able to give any history.    Review of systems:     Unobtainable       Physical exam:  /79   Pulse 71   Temp 97.8 °F (36.6 °C) (Temporal)   Resp 18   Wt 64 kg (141 lb)   SpO2 98%   BMI 20.23 kg/m²   Constitutional: As above  Eyes: Closed  ENT: clear, no bleeding.  No external masses.  Lips normal formation.  Neck: supple, full ROM, no JVD, no bruits, no lymphadenopathy.  No masses.  trachea midline.  Heart: Very distant to auscultation.  Regular rate & rhythm, normal S1 & S2, no abnormal murmurs.  No heave.  Lungs: CTA with baseline respirations.  No accessory muscles.  Abd: soft, non-tender.  Normal bowel sounds.   Neuro: Full ROM X 4, EOMI, no tremors.  EXT: No bilateral lower extremity edema  Skin: warm, dry, intact.  Good turgor.  Psych: As above    Patient seen and examined.  Chart, labs & data reviewed.    A:  Hyponatremia  Lactic acidosis  Altered mental status beyond his baseline dementia  Dementia  Agitated/combative/restrained/in no sedated  Chronically elevated troponins but higher than his  baseline.  Possibly falsely elevated due to his comorbidities versus type II NSTEMI due to demand ischemia due to the above.  Coronary artery disease  Prior history of HFpEF.  Normal ejection fraction 1 year ago.  No decompensation on today's physical exam.  Hypertension  Prior pericarditis  Chronic renal insufficiency  Alcohol abuse  Alcoholic encephalitis  Alcohol induced dementia  Polydipsia  Seizure disorder  GERD  COPD.  He continues to smoke      Rec:  Echocardiogram  He is not a candidate for any invasive cardiac procedures at this time.  Comorbidities per others  CODE STATUS per others    Electronically signed by Kiel Beavers DO on 11/13/2024 at 4:59 PM    Note: This report was completed using computerized voice recognition software. Every effort has been made to ensure accuracy, however; and invert and computerized transcription errors may be present.

## 2024-11-13 NOTE — H&P
[facility] History and Physical          PCP: Wes Lala MD    Date of Admission: 11/12/2024    Date of Service: Pt seen/examined on 11/12/2024 and is admitted to Inpatient with expected LOS greater than two midnights due to medical therapy.  Placed in inpatient    Chief Complaint:  had concerns including Altered Mental Status (Pt sudden confusion after doctors appointment.  ).    History Of Present Illness:    Mr. Jason Fisher, a 64 y.o. year old male with past medical history of chronic alcoholism,  Chronic hyponatremia, seizure disorder, psychogenic polydipsia, anxiety, CAD s/p CABG, hypertension, hyperlipidemia, dementia, COPD who presented to the emergency department with altered mental status.  Patient appears agitated, does not follow commands, no collateral information at bedside.  Reportedly his brother called the squad after patient was found altered mental status, appeared confused.  Reportedly had a doctor's appointment today, after going home his brother who lives with him noticed he was confused.  No falls, head trauma, no reports of chest pain, nausea, vomiting, diarrhea.  Reportedly patient drinks excessively water, and also concerns for alcohol intake, no specific seizure activity noted.  Had recent hospitalization in September 2024 for seizure activity, low sodium.  His labs were significant for sodium 121, lactic acid 4.6, CT head pending, chest x-ray unremarkable, negative alcohol, placed on Precedex, received 1500 mg Keppra in the ED, admitted for further evaluation.      Past Medical History:    No past medical history on file.    Past Surgical History:    No past surgical history on file.    Medications Prior to Admission:      Prior to Admission medications    Medication Sig Start Date End Date Taking? Authorizing Provider   divalproex (DEPAKOTE SPRINKLE) 125 MG DR capsule Take 1 capsule by mouth in the morning and 1 capsule in the evening.    Provider,

## 2024-11-13 NOTE — ACP (ADVANCE CARE PLANNING)
Advance Care Planning   Healthcare Decision Maker:    Primary Decision Maker: TOBI BRODY - Brother/Sister - [phone]    Today we documented Decision Maker(s) consistent with Legal Next of Kin hierarchy.    Electronically signed by TERRANCE Lagunas on 11/13/2024 at 3:06 PM

## 2024-11-13 NOTE — ED NOTES
Complete bed change at this time.  Patient kicking staff, grabbing at lines.  Unable to follow commands

## 2024-11-13 NOTE — PROGRESS NOTES
[facility] Hospitalist Progress Note    SYNOPSIS: Patient admitted on 2024 for AMS (altered mental status)    Mr. Jason Fisher, a 64 y.o. year old male with past medical history of chronic alcoholism,  Chronic hyponatremia, seizure disorder, psychogenic polydipsia, anxiety, CAD s/p CABG, hypertension, hyperlipidemia, dementia, COPD who presented to the emergency department with altered mental status. Patient's brother reported that patient drinks excessive amounts of water and alcohol.  Patient had recent hospitalization in 2024 for seizure activity and hyponatremia. His labs were significant for sodium 121, lactic acid 4.6, CT head negative for acute finding, chest x-ray unremarkable, negative alcohol, placed on Precedex, received 1500 mg Keppra in the ED, admitted for further evaluation. Nephrology and neurology was consulted. Sodium began to improve with NS infusion. Troponin 48-->95-->145 on admission. EKG showed sinus tachycardia. Cardiology was consulted.     SUBJECTIVE:  Stable overnight. No other overnight issues reported.   Patient seen and examined  Records reviewed.   Patient lethargic but arousable, able to follow some commands. He states he is thirsty and would like some water.       Temp (24hrs), Av.6 °F (37 °C), Min:97.9 °F (36.6 °C), Max:99 °F (37.2 °C)    DIET: Diet NPO  CODE: Full Code  No intake or output data in the 24 hours ending 24 1056    Review of Systems  All bolded are positive; please see HPI  General:  Fever, chills, diaphoresis, fatigue, malaise, night sweats, weight loss  Psychological:  Anxiety, disorientation, hallucinations.  ENT:  Epistaxis, headaches, vertigo, visual changes.  Cardiovascular:  Chest pain, irregular heartbeats, palpitations, paroxysmal nocturnal dyspnea.  Respiratory:  Shortness of breath, coughing, sputum production, hemoptysis, wheezing, orthopnea.  Gastrointestinal:  Nausea, vomiting, diarrhea, heartburn, constipation,  acidosis  Leukocytosis  Suspected due to seizure, medications, clinically appear euvolemic  Lactic acid 4.6, bicarb 18 with anion gap of 15, euglycemic  Lactic acid 3.1 this AM, continue to trend until normalizes  WBC count normalized   Blood cultures NGTD   UA unremarkable      Hypertensive urgency  Hx CAD s/p CABG  Hyperlipidemia  Elevated troponin  No reports of chest pain, EKG sinus tachycardia,  Slightly elevated troponin 48-->95-->145, cxr unremarkable  /158, improved with Versed in the ED,  Repeat troponin, cardiology consulted, Lipitor, metoprolol    DVT Prophylaxis [x] Lovenox, []  Heparin, [] SCDs, [] Ambulation   GI Prophylaxis [x] PPI,  [] H2 Blocker,  [] Carafate,  [] Diet/Tube Feeds   Disposition Patient requires continued admission due to awaiting improvement in mental status and multispecialty consults   MDM [] Low, [x] Moderate,[]  High       Medications:  REVIEWED DAILY    Infusion Medications    dexmedeTOMIDine (PRECEDEX) 400 mcg in sodium chloride 0.9 % 100 mL infusion Stopped (11/12/24 8413)    sodium chloride      sodium chloride 125 mL/hr at 11/13/24 0540     Scheduled Medications    metoprolol tartrate  25 mg Oral BID    divalproex  125 mg Oral BID    atorvastatin  40 mg Oral Daily    sodium chloride flush  5-40 mL IntraVENous 2 times per day    enoxaparin  40 mg SubCUTAneous Daily    sodium chloride flush  5-40 mL IntraVENous 2 times per day    thiamine  500 mg IntraVENous Q8H    Followed by    [START ON 11/14/2024] thiamine  250 mg IntraVENous Q24H    Followed by    [START ON 11/19/2024] thiamine  100 mg Oral Daily    multivitamin  1 tablet Oral Daily    folic acid  1 mg Oral Daily     PRN Meds: sodium chloride flush, sodium chloride, potassium chloride **OR** potassium alternative oral replacement **OR** potassium chloride, magnesium sulfate, ondansetron **OR** ondansetron, polyethylene glycol, acetaminophen **OR** acetaminophen, sodium chloride flush, LORazepam **OR** LORazepam

## 2024-11-13 NOTE — PLAN OF CARE
Problem: Safety - Medical Restraint  Goal: Remains free of injury from restraints (Restraint for Interference with Medical Device)  Description: INTERVENTIONS:  1. Determine that other, less restrictive measures have been tried or would not be effective before applying the restraint  2. Evaluate the patient's condition at the time of restraint application  3. Inform patient/family regarding the reason for restraint  4. Q2H: Monitor safety, psychosocial status, comfort, nutrition and hydration  11/13/2024 1117 by Yolanda Cardoso RN  Outcome: Progressing  Flowsheets (Taken 11/13/2024 1117)  Remains free of injury from restraints (restraint for interference with medical device):   Determine that other, less restrictive measures have been tried or would not be effective before applying the restraint   Evaluate the patient's condition at the time of restraint application   Inform patient/family regarding the reason for restraint   Every 2 hours: Monitor safety, psychosocial status, comfort, nutrition and hydration  11/13/2024 0425 by Ana Khan RN  Outcome: Progressing     Problem: Discharge Planning  Goal: Discharge to home or other facility with appropriate resources  11/13/2024 1117 by Yolanda Cardoso RN  Outcome: Progressing  Flowsheets (Taken 11/13/2024 1117)  Discharge to home or other facility with appropriate resources: Identify barriers to discharge with patient and caregiver  11/13/2024 0425 by Ana Khan RN  Outcome: Progressing     Problem: Pain  Goal: Verbalizes/displays adequate comfort level or baseline comfort level  11/13/2024 1117 by Yolanda Cardoso RN  Outcome: Progressing  Flowsheets (Taken 11/13/2024 1117)  Verbalizes/displays adequate comfort level or baseline comfort level:   Encourage patient to monitor pain and request assistance   Assess pain using appropriate pain scale   Administer analgesics based on type and severity of pain and evaluate response   Implement non-pharmacological measures as

## 2024-11-13 NOTE — CONSULTS
[facility]  MD Jorge Garay MD Ali Hassan, MD Lisa Kniska, KAITLYNN Bunn  Consultation  Patient's Name: Jason Fisher  1:55 PM  11/13/2024    Nephrologist: MD Jay    Reason for Consult:  Hyponatremia/Acute kidney injury  Requesting Physician:  Mackenzie Camarena MD      History Obtained From: Chart    History of Present Ilness:         Jason Fisher is a 64-year-old gentleman that presented to the ED on 11/12/2024 with altered mental status and was in an agitated state and unable to follow commands.  He apparently had a doctor's appointment and returned from bed in a confused state as detected by his brother.  No trauma fall or injury reported by his brother.  There was no report of nausea, vomiting, diarrhea, chest pain, shortness of breath, fever, chills, syncope, headache, dizziness or seizure activity.  He reportedly drinks excessive amounts of water and there is concern for alcohol intake.  He has a past medical history that includes: Chronic hyponatremia, seizure disorder, chronic alcoholism, psychogenic polydipsia, anxiety, CAD, status post CABG, hypertension, hyperlipidemia, dementia and COPD.         Labs in the ED were significant for as follows: Sodium 121, chloride 84, carbon dioxide 18, anion gap 19, lactic acid 4.6, magnesium 1.5, total protein 8.4, troponin 48, AST 42, glucose 105 and WBCs 18.2.  Urine drug toxicity was negative.  Urine studies revealed trace bacteria and moderate hemoglobin.  Chest x-ray showed no acute process.  CT of the head without contrast showed no acute intracranial abnormality and unchanged chronic bifrontal encephalomalacia, left greater than right.  His blood pressure was elevated -190/158.  He did experience hypotension while in the ED and BP dropped to 52/33   IV fluids were initiated.  EKG showed sinus tachycardia.  He was given midazolam, droperidol, ketamine and ativan and was placed on a Precedex  tablet 1 mg, Daily  LORazepam (ATIVAN) tablet 1 mg, Q1H PRN   Or  LORazepam (ATIVAN) injection 1 mg, Q1H PRN   Or  LORazepam (ATIVAN) tablet 2 mg, Q1H PRN   Or  LORazepam (ATIVAN) injection 2 mg, Q1H PRN   Or  LORazepam (ATIVAN) tablet 3 mg, Q1H PRN   Or  LORazepam (ATIVAN) injection 3 mg, Q1H PRN   Or  LORazepam (ATIVAN) tablet 4 mg, Q1H PRN   Or  LORazepam (ATIVAN) injection 4 mg, Q1H PRN  0.9 % sodium chloride infusion, Continuous        Review of Systems:   Unable to be completed due to current medical state.    Physical exam:  General Appearance:  Restless.  Oriented x 1  Skin:  Skin color, texture, turgor normal. No rashes or lesions.  Eyes:  PERRL, EOMI, Sclera nonicteric, and Conjunctiva clear  Ears:  canals and TMs intact  Nose/Sinuses:  Nares normal. Septum midline. Mucosa normal. No drainage or sinus tenderness.  Mouth/Throat:  Mucosa moist.  No lesions.  Pharynx without erythema, edema or exudate.  Neck:  neck- supple, no mass, non-tender  Lungs:  Normal expansion.  Clear to auscultation.  No rales, rhonchi, or wheezing.  Heart:   Regular rate and rhythm without murmur, gallop or rub.  Abdomen:  Soft, non-tender, normal bowel sounds.  No bruits, organomegaly or masses.  Extremities: Extremities warm to touch, pink, with no edema. Soft wrist restraints on.  Musculoskeletal:  No joint swelling, deformity, or tenderness.  Peripheral Pulses:  Normal  Neurologic:  Gait normal. Reflexes normal and symmetric. Sensation grossly intact.        Data:   Labs:  CBC with Differential:    Lab Results   Component Value Date/Time    WBC 11.9 11/13/2024 06:19 AM    RBC 4.69 11/13/2024 06:19 AM    HGB 14.0 11/13/2024 06:19 AM    HCT 41.2 11/13/2024 06:19 AM     11/13/2024 06:19 AM    MCV 87.8 11/13/2024 06:19 AM    MCH 29.9 11/13/2024 06:19 AM    MCHC 34.0 11/13/2024 06:19 AM    RDW 12.8 11/13/2024 06:19 AM    LYMPHOPCT 12 11/13/2024 06:19 AM    MONOPCT 7 11/13/2024 06:19 AM    EOSPCT 0 11/13/2024 06:19 AM

## 2024-11-13 NOTE — PROGRESS NOTES
Charge Nurse stated that the patient is not and CO and the family came in and said they will be here for and hr so I wanted to give them privacy so I came back to my floor to help them out for the hr and called staffing to inform them.

## 2024-11-13 NOTE — PROGRESS NOTES
Multiple call were received from Su stating she is pt sister requesting update on pt. This RN and Clemencia RN spoke with Su and informed her that she is not listed as a  in the pt chart. Su is very adamant that Stuart Fisher (her and pt brother) called and told the unit that it was okay to disclose information to her. This RN explained that we cannot go by her stating this as there is no documentation in the pt chart regarding nursing staff speaking with Stuart on this topic nor have any of the nurses currently on shift. This RN and Clemencia RN repeatedly reiterated that information cannot be disclosed d/t HIPAA. Su states she understand this but \"just go ask Jason if his sister Su from [place] can't get information. We go through this all the time when he is in the hospital\". This RN explained that this cannot happen currently and if she would like any information she can contact Stuart directly for an update on pt condition. Su became upset stating that she wants to be able to call directly instead of going through Stuart. Su also upset that no nursing staff called her back with an update at 1030 after being \"promised\" a return call after she shower. This RN explained repeatedly that nursing staff will not be returning calls for updates on patients to someone not listed on a contact list. Su continued to be upset despite this RN multiple attempts to explain the situation.

## 2024-11-13 NOTE — PROGRESS NOTES
Pt continues to pull at/remove medical equipment, attempt to get out of bed, has poor safety awareness, and is unable to follow commands or be redirected. Pt remains in restraints at this time.

## 2024-11-13 NOTE — ED NOTES
Versed and inapsine given.  Patient continues to pull at lines, climb out of bed, pinch and attempt to kick staff. Restraints requested.  Not ordered.  Sitter at bedside.

## 2024-11-14 ENCOUNTER — APPOINTMENT (OUTPATIENT)
Dept: ULTRASOUND IMAGING | Age: 64
DRG: 640 | End: 2024-11-14
Payer: MEDICARE

## 2024-11-14 LAB
ANION GAP SERPL CALCULATED.3IONS-SCNC: 10 MMOL/L (ref 7–16)
ANION GAP SERPL CALCULATED.3IONS-SCNC: 11 MMOL/L (ref 7–16)
ANION GAP SERPL CALCULATED.3IONS-SCNC: 9 MMOL/L (ref 7–16)
BUN SERPL-MCNC: 17 MG/DL (ref 6–23)
CALCIUM SERPL-MCNC: 8.4 MG/DL (ref 8.6–10.2)
CHLORIDE SERPL-SCNC: 104 MMOL/L (ref 98–107)
CHLORIDE SERPL-SCNC: 106 MMOL/L (ref 98–107)
CHLORIDE SERPL-SCNC: 107 MMOL/L (ref 98–107)
CO2 SERPL-SCNC: 18 MMOL/L (ref 22–29)
CO2 SERPL-SCNC: 20 MMOL/L (ref 22–29)
CO2 SERPL-SCNC: 22 MMOL/L (ref 22–29)
CREAT SERPL-MCNC: 1.3 MG/DL (ref 0.7–1.2)
CREAT SERPL-MCNC: 1.4 MG/DL (ref 0.7–1.2)
CREAT SERPL-MCNC: 1.5 MG/DL (ref 0.7–1.2)
D-DIMER QUANTITATIVE: <200 NG/ML DDU (ref 0–230)
ECHO AO ASC DIAM: 3 CM
ECHO AV AREA PEAK VELOCITY: 2.9 CM2
ECHO AV AREA VTI: 3.1 CM2
ECHO AV CUSP MM: 1.8 CM
ECHO AV MEAN GRADIENT: 3 MMHG
ECHO AV MEAN VELOCITY: 0.9 M/S
ECHO AV PEAK GRADIENT: 6 MMHG
ECHO AV PEAK VELOCITY: 1.2 M/S
ECHO AV VELOCITY RATIO: 0.83
ECHO AV VTI: 17.2 CM
ECHO LA DIAMETER: 3.4 CM
ECHO LA VOL A-L A2C: 58 ML (ref 18–58)
ECHO LA VOL A-L A4C: 54 ML (ref 18–58)
ECHO LA VOL MOD A2C: 51 ML (ref 18–58)
ECHO LA VOL MOD A4C: 47 ML (ref 18–58)
ECHO LA VOLUME AREA LENGTH: 58 ML
ECHO LV EF PHYSICIAN: 60 %
ECHO LV FRACTIONAL SHORTENING: 39 % (ref 28–44)
ECHO LV INTERNAL DIMENSION DIASTOLIC: 3.6 CM (ref 4.2–5.9)
ECHO LV INTERNAL DIMENSION SYSTOLIC: 2.2 CM
ECHO LV ISOVOLUMETRIC RELAXATION TIME (IVRT): 138.4 MS
ECHO LV IVSD: 1.2 CM (ref 0.6–1)
ECHO LV MASS 2D: 150.6 G (ref 88–224)
ECHO LV POSTERIOR WALL DIASTOLIC: 1.3 CM (ref 0.6–1)
ECHO LV RELATIVE WALL THICKNESS RATIO: 0.72
ECHO LVOT AREA: 3.5 CM2
ECHO LVOT AV VTI INDEX: 0.88
ECHO LVOT DIAM: 2.1 CM
ECHO LVOT MEAN GRADIENT: 2 MMHG
ECHO LVOT PEAK GRADIENT: 4 MMHG
ECHO LVOT PEAK VELOCITY: 1 M/S
ECHO LVOT SV: 52.6 ML
ECHO LVOT VTI: 15.2 CM
ECHO MV "A" WAVE DURATION: 107.3 MSEC
ECHO MV A VELOCITY: 0.82 M/S
ECHO MV AREA PHT: 4 CM2
ECHO MV AREA VTI: 3 CM2
ECHO MV E DECELERATION TIME (DT): 189.7 MS
ECHO MV E VELOCITY: 0.59 M/S
ECHO MV E/A RATIO: 0.72
ECHO MV LVOT VTI INDEX: 1.16
ECHO MV MAX VELOCITY: 0.8 M/S
ECHO MV MEAN GRADIENT: 1 MMHG
ECHO MV MEAN VELOCITY: 0.5 M/S
ECHO MV PEAK GRADIENT: 2 MMHG
ECHO MV PRESSURE HALF TIME (PHT): 55.2 MS
ECHO MV VTI: 17.6 CM
ECHO PV MAX VELOCITY: 1.3 M/S
ECHO PV MEAN GRADIENT: 4 MMHG
ECHO PV MEAN VELOCITY: 1 M/S
ECHO PV PEAK GRADIENT: 7 MMHG
ECHO PV VTI: 22.4 CM
ECHO RV INTERNAL DIMENSION: 3.4 CM
GFR, ESTIMATED: 50 ML/MIN/1.73M2
GFR, ESTIMATED: 57 ML/MIN/1.73M2
GFR, ESTIMATED: 58 ML/MIN/1.73M2
GLUCOSE SERPL-MCNC: 120 MG/DL (ref 74–99)
GLUCOSE SERPL-MCNC: 131 MG/DL (ref 74–99)
GLUCOSE SERPL-MCNC: 76 MG/DL (ref 74–99)
POTASSIUM SERPL-SCNC: 4.1 MMOL/L (ref 3.5–5)
POTASSIUM SERPL-SCNC: 4.3 MMOL/L (ref 3.5–5)
POTASSIUM SERPL-SCNC: 4.4 MMOL/L (ref 3.5–5)
SODIUM SERPL-SCNC: 133 MMOL/L (ref 132–146)
SODIUM SERPL-SCNC: 137 MMOL/L (ref 132–146)
SODIUM SERPL-SCNC: 137 MMOL/L (ref 132–146)
TROPONIN I SERPL HS-MCNC: 68 NG/L (ref 0–11)

## 2024-11-14 PROCEDURE — 2580000003 HC RX 258: Performed by: INTERNAL MEDICINE

## 2024-11-14 PROCEDURE — 6370000000 HC RX 637 (ALT 250 FOR IP): Performed by: INTERNAL MEDICINE

## 2024-11-14 PROCEDURE — 93306 TTE W/DOPPLER COMPLETE: CPT | Performed by: INTERNAL MEDICINE

## 2024-11-14 PROCEDURE — 2500000003 HC RX 250 WO HCPCS: Performed by: INTERNAL MEDICINE

## 2024-11-14 PROCEDURE — 99232 SBSQ HOSP IP/OBS MODERATE 35: CPT | Performed by: INTERNAL MEDICINE

## 2024-11-14 PROCEDURE — 2140000000 HC CCU INTERMEDIATE R&B

## 2024-11-14 PROCEDURE — 80048 BASIC METABOLIC PNL TOTAL CA: CPT

## 2024-11-14 PROCEDURE — 76770 US EXAM ABDO BACK WALL COMP: CPT

## 2024-11-14 PROCEDURE — APPSS30 APP SPLIT SHARED TIME 16-30 MINUTES

## 2024-11-14 PROCEDURE — 6360000002 HC RX W HCPCS: Performed by: INTERNAL MEDICINE

## 2024-11-14 PROCEDURE — 2580000003 HC RX 258

## 2024-11-14 PROCEDURE — 85379 FIBRIN DEGRADATION QUANT: CPT

## 2024-11-14 RX ORDER — ESCITALOPRAM OXALATE 10 MG/1
10 TABLET ORAL DAILY
Status: ON HOLD | COMMUNITY

## 2024-11-14 RX ORDER — NICOTINE 21 MG/24HR
1 PATCH, TRANSDERMAL 24 HOURS TRANSDERMAL DAILY
Status: DISPENSED | OUTPATIENT
Start: 2024-11-14

## 2024-11-14 RX ORDER — CLONAZEPAM 0.5 MG/1
0.5 TABLET ORAL 2 TIMES DAILY PRN
Status: ON HOLD | COMMUNITY

## 2024-11-14 RX ORDER — NICOTINE 21 MG/24HR
1 PATCH, TRANSDERMAL 24 HOURS TRANSDERMAL EVERY 24 HOURS
Status: ON HOLD | COMMUNITY

## 2024-11-14 RX ORDER — LEVETIRACETAM 500 MG/1
500 TABLET ORAL 2 TIMES DAILY
Status: ON HOLD | COMMUNITY

## 2024-11-14 RX ORDER — DIVALPROEX SODIUM 125 MG/1
125 CAPSULE, COATED PELLETS ORAL 2 TIMES DAILY
Status: DISPENSED | OUTPATIENT
Start: 2024-11-14

## 2024-11-14 RX ORDER — DEXTROSE MONOHYDRATE 50 MG/ML
INJECTION, SOLUTION INTRAVENOUS CONTINUOUS
Status: DISCONTINUED | OUTPATIENT
Start: 2024-11-14 | End: 2024-11-15

## 2024-11-14 RX ADMIN — DEXTROSE MONOHYDRATE: 50 INJECTION, SOLUTION INTRAVENOUS at 15:20

## 2024-11-14 RX ADMIN — PANTOPRAZOLE SODIUM 40 MG: 40 INJECTION, POWDER, FOR SOLUTION INTRAVENOUS at 08:54

## 2024-11-14 RX ADMIN — DIVALPROEX SODIUM 125 MG: 125 CAPSULE ORAL at 21:33

## 2024-11-14 RX ADMIN — SODIUM CHLORIDE, PRESERVATIVE FREE 10 ML: 5 INJECTION INTRAVENOUS at 09:04

## 2024-11-14 RX ADMIN — THIAMINE HYDROCHLORIDE 500 MG: 100 INJECTION, SOLUTION INTRAMUSCULAR; INTRAVENOUS at 05:37

## 2024-11-14 RX ADMIN — METOPROLOL TARTRATE 25 MG: 25 TABLET, FILM COATED ORAL at 21:33

## 2024-11-14 RX ADMIN — METOPROLOL TARTRATE 2.5 MG: 1 INJECTION, SOLUTION INTRAVENOUS at 05:39

## 2024-11-14 RX ADMIN — THIAMINE HYDROCHLORIDE 500 MG: 100 INJECTION, SOLUTION INTRAMUSCULAR; INTRAVENOUS at 13:23

## 2024-11-14 RX ADMIN — METOPROLOL TARTRATE 2.5 MG: 1 INJECTION, SOLUTION INTRAVENOUS at 08:53

## 2024-11-14 RX ADMIN — DEXTROSE MONOHYDRATE: 50 INJECTION, SOLUTION INTRAVENOUS at 18:23

## 2024-11-14 RX ADMIN — ENOXAPARIN SODIUM 40 MG: 100 INJECTION SUBCUTANEOUS at 08:53

## 2024-11-14 RX ADMIN — SODIUM CHLORIDE: 9 INJECTION, SOLUTION INTRAVENOUS at 06:46

## 2024-11-14 NOTE — PLAN OF CARE
Problem: Safety - Medical Restraint  Goal: Remains free of injury from restraints (Restraint for Interference with Medical Device)  Description: INTERVENTIONS:  1. Determine that other, less restrictive measures have been tried or would not be effective before applying the restraint  2. Evaluate the patient's condition at the time of restraint application  3. Inform patient/family regarding the reason for restraint  4. Q2H: Monitor safety, psychosocial status, comfort, nutrition and hydration  Outcome: Progressing     Problem: Discharge Planning  Goal: Discharge to home or other facility with appropriate resources  Outcome: Progressing     Problem: Pain  Goal: Verbalizes/displays adequate comfort level or baseline comfort level  Outcome: Progressing     Problem: Safety - Adult  Goal: Free from fall injury  Outcome: Progressing     Problem: Skin/Tissue Integrity  Goal: Absence of new skin breakdown  Description: 1.  Monitor for areas of redness and/or skin breakdown  2.  Assess vascular access sites hourly  3.  Every 4-6 hours minimum:  Change oxygen saturation probe site  4.  Every 4-6 hours:  If on nasal continuous positive airway pressure, respiratory therapy assess nares and determine need for appliance change or resting period.  Outcome: Progressing     Problem: ABCDS Injury Assessment  Goal: Absence of physical injury  Outcome: Progressing     Problem: Confusion  Goal: Confusion, delirium, dementia, or psychosis is improved or at baseline  Description: INTERVENTIONS:  1. Assess for possible contributors to thought disturbance, including medications, impaired vision or hearing, underlying metabolic abnormalities, dehydration, psychiatric diagnoses, and notify attending LIP  2. [place] high risk fall precautions, as indicated  3. Provide frequent short contacts to provide reality reorientation, refocusing and direction  4. Decrease environmental stimuli, including noise as appropriate  5. Monitor and

## 2024-11-14 NOTE — PLAN OF CARE
Problem: Safety - Medical Restraint  Goal: Remains free of injury from restraints (Restraint for Interference with Medical Device)  Description: INTERVENTIONS:  1. Determine that other, less restrictive measures have been tried or would not be effective before applying the restraint  2. Evaluate the patient's condition at the time of restraint application  3. Inform patient/family regarding the reason for restraint  4. Q2H: Monitor safety, psychosocial status, comfort, nutrition and hydration  11/14/2024 0915 by Osiel Pollard RN  Outcome: Progressing  11/14/2024 0205 by Jill Barber RN  Outcome: Progressing     Problem: Discharge Planning  Goal: Discharge to home or other facility with appropriate resources  11/14/2024 0915 by Osiel Pollard RN  Outcome: Progressing  11/14/2024 0205 by Jill Barber RN  Outcome: Progressing     Problem: Pain  Goal: Verbalizes/displays adequate comfort level or baseline comfort level  11/14/2024 0915 by Osiel Pollard RN  Outcome: Progressing  11/14/2024 0205 by Jill Barber RN  Outcome: Progressing     Problem: Safety - Adult  Goal: Free from fall injury  11/14/2024 0915 by Osiel Pollard RN  Outcome: Progressing  11/14/2024 0205 by Jill Barber RN  Outcome: Progressing     Problem: Skin/Tissue Integrity  Goal: Absence of new skin breakdown  Description: 1.  Monitor for areas of redness and/or skin breakdown  2.  Assess vascular access sites hourly  3.  Every 4-6 hours minimum:  Change oxygen saturation probe site  4.  Every 4-6 hours:  If on nasal continuous positive airway pressure, respiratory therapy assess nares and determine need for appliance change or resting period.  11/14/2024 0915 by Osiel Pollard RN  Outcome: Progressing  11/14/2024 0205 by Jill Barber RN  Outcome: Progressing     Problem: ABCDS Injury Assessment  Goal: Absence of physical injury  11/14/2024 0915 by Osiel Pollard RN  Outcome: Progressing  11/14/2024 0205 by Sunil

## 2024-11-14 NOTE — PROGRESS NOTES
[place], [place]  +++++++++++++++++++++++++++++++++++++++++++++++++  NOTE: This report was transcribed using voice recognition software. Every effort was made to ensure accuracy; however, inadvertent computerized transcription errors may be present.

## 2024-11-14 NOTE — PROGRESS NOTES
Inpatient Cardiology Progress note     PATIENT IS BEING FOLLOWED FOR: Elevated troponin    Jason Fisher is a 64 y.o. male known to Dr. Beavers from this admission.  He follows with  cardiology.  Patient has past medical history of CAD s/p CABG x 3 (L-LAD seq to D1, HERSON-OM1), HFpEF, hypertension, hyperlipidemia, Hx pericarditis, CKD stage IIIa, Hx EtOH abuse with alcoholic encephalopathy/dementia, psychogenic polydipsia, seizure-like activity, GERD and tobacco use.    Patient resides with his brother who is his full-time caregiver.  He had doctor's appointment 2024 and afterwards had increased confusion/altered mental status as brought into the emergency department.  Patient was found to be hyponatremic with a sodium level of 121 and troponin levels of 48> 95> 145.  Cardiology was consulted for elevated troponin.  Echocardiogram completed 2024 revealing EF 60% with grade 1 diastolic dysfunction.     Patient is awake and alert today with his brother at the bedside.  Patient reports that he completes all activities of daily living independently at home and denies any exertional chest pain or shortness of breath.  Patient's brother states that he does have a follow-up scheduled with his cardiologist at  in December of this year.  Patient denies any chest pain, palpitations, lightheadedness, dizziness, shortness of breath, HERNANDEZ, orthopnea, PND, lower extremity swelling.      PHYSICAL EXAM:   /66   Pulse 76   Temp 97.6 °F (36.4 °C) (Temporal)   Resp 17   Ht 1.778 m (5' 10\")   Wt 64 kg (141 lb)   SpO2 95%   BMI 20.23 kg/m²    B/P Range last 24 hours: Systolic (24hrs), Av , Min:88 , Max:138    Diastolic (24hrs), Av, Min:54, Max:79    CONST:  Awake, alert and cooperative. No apparent distress  HEENT:   Head- Normocephalic, atraumatic   Eyes- Conjunctivae pink, anicteric  Throat- Oral mucosa pink and moist  Neck-  No stridor, trachea midline, no jugular venous distention. No  11/14/2024 and reviewed.   Continue current cardiac medications  Future ischemic evaluation once clinically compensated.    Electronically signed by ANNIA Butcher CNP on 11/14/2024 at 3:44 PM     I have personally seen and evaluated the patient.  I personally obtained the history and performed the physical exam.  They are currently pain free.  I personally reviewed all of the above labs and data.  All of the above assessments and recommendations are from me.  All of the above cardiac medical decisions are from me.    He was more awake and alert today.  He was oriented to person place and time.  He denies ever having any chest discomfort or shortness of breath.    ENT: clear, no bleeding.  No external masses.  Lips normal formation.  Neck: supple, full ROM, no JVD, no bruits, no lymphadenopathy.  No masses.  trachea midline.  Heart: regular rate & rhythm, normal S1 & S2, no abnormal murmurs.  No heave.  Lungs: CTA.  No accessory muscles.  Abd: soft, non-tender.  Normal bowel sounds.   Neuro: Full ROM X 4, EOMI, no tremors.  EXT: No bilateral lower extremity edema  Skin: warm, dry, intact.  Good turgor.    Assessment/recommendations:  Hyponatremia, improving  Lactic acidosis, improving  Altered mental status, improving  History of HFpEF.  No decompensated HFpEF this admission.  Coronary artery disease.  No symptoms this admission.  Chronically elevated troponin.  Higher than his baseline but flat pattern possibly due to his above comorbidities.  Echo shows normal systolic function with no wall motion abnormalities and no evidence of myocardial infarction.  Difficult visualization on the echo but possibly mildly dilated and mildly hypokinetic right ventricle suggesting a possible pulmonary embolism.  Will check a D-dimer.  Hypertension  Prior pericarditis  Chronic renal insufficiency  Alcohol abuse  Alcoholic encephalitis  Alcohol-induced dementia  Polydipsia  Seizure disorder  GERD  COPD  I will check the

## 2024-11-15 LAB
ANION GAP SERPL CALCULATED.3IONS-SCNC: 10 MMOL/L (ref 7–16)
ANION GAP SERPL CALCULATED.3IONS-SCNC: 11 MMOL/L (ref 7–16)
ANION GAP SERPL CALCULATED.3IONS-SCNC: 11 MMOL/L (ref 7–16)
ANION GAP SERPL CALCULATED.3IONS-SCNC: 14 MMOL/L (ref 7–16)
ANION GAP SERPL CALCULATED.3IONS-SCNC: 9 MMOL/L (ref 7–16)
BUN SERPL-MCNC: 10 MG/DL (ref 6–23)
BUN SERPL-MCNC: 12 MG/DL (ref 6–23)
BUN SERPL-MCNC: 14 MG/DL (ref 6–23)
CALCIUM SERPL-MCNC: 8.5 MG/DL (ref 8.6–10.2)
CALCIUM SERPL-MCNC: 8.7 MG/DL (ref 8.6–10.2)
CALCIUM SERPL-MCNC: 9.1 MG/DL (ref 8.6–10.2)
CALCIUM SERPL-MCNC: 9.2 MG/DL (ref 8.6–10.2)
CALCIUM SERPL-MCNC: 9.3 MG/DL (ref 8.6–10.2)
CHLORIDE SERPL-SCNC: 102 MMOL/L (ref 98–107)
CHLORIDE SERPL-SCNC: 102 MMOL/L (ref 98–107)
CHLORIDE SERPL-SCNC: 103 MMOL/L (ref 98–107)
CHLORIDE SERPL-SCNC: 105 MMOL/L (ref 98–107)
CHLORIDE SERPL-SCNC: 106 MMOL/L (ref 98–107)
CO2 SERPL-SCNC: 20 MMOL/L (ref 22–29)
CO2 SERPL-SCNC: 21 MMOL/L (ref 22–29)
CO2 SERPL-SCNC: 22 MMOL/L (ref 22–29)
CO2 SERPL-SCNC: 23 MMOL/L (ref 22–29)
CO2 SERPL-SCNC: 23 MMOL/L (ref 22–29)
CREAT SERPL-MCNC: 1.1 MG/DL (ref 0.7–1.2)
CREAT SERPL-MCNC: 1.2 MG/DL (ref 0.7–1.2)
CREAT SERPL-MCNC: 1.2 MG/DL (ref 0.7–1.2)
GFR, ESTIMATED: 67 ML/MIN/1.73M2
GFR, ESTIMATED: 70 ML/MIN/1.73M2
GFR, ESTIMATED: 71 ML/MIN/1.73M2
GFR, ESTIMATED: 75 ML/MIN/1.73M2
GFR, ESTIMATED: 78 ML/MIN/1.73M2
GLUCOSE SERPL-MCNC: 100 MG/DL (ref 74–99)
GLUCOSE SERPL-MCNC: 123 MG/DL (ref 74–99)
GLUCOSE SERPL-MCNC: 128 MG/DL (ref 74–99)
GLUCOSE SERPL-MCNC: 145 MG/DL (ref 74–99)
GLUCOSE SERPL-MCNC: 99 MG/DL (ref 74–99)
POTASSIUM SERPL-SCNC: 3.9 MMOL/L (ref 3.5–5)
POTASSIUM SERPL-SCNC: 4 MMOL/L (ref 3.5–5)
POTASSIUM SERPL-SCNC: 4.1 MMOL/L (ref 3.5–5)
SODIUM SERPL-SCNC: 135 MMOL/L (ref 132–146)
SODIUM SERPL-SCNC: 136 MMOL/L (ref 132–146)
SODIUM SERPL-SCNC: 136 MMOL/L (ref 132–146)
SODIUM SERPL-SCNC: 137 MMOL/L (ref 132–146)
SODIUM SERPL-SCNC: 138 MMOL/L (ref 132–146)

## 2024-11-15 PROCEDURE — 6370000000 HC RX 637 (ALT 250 FOR IP): Performed by: INTERNAL MEDICINE

## 2024-11-15 PROCEDURE — 97530 THERAPEUTIC ACTIVITIES: CPT

## 2024-11-15 PROCEDURE — 2580000003 HC RX 258: Performed by: INTERNAL MEDICINE

## 2024-11-15 PROCEDURE — 6360000002 HC RX W HCPCS: Performed by: INTERNAL MEDICINE

## 2024-11-15 PROCEDURE — 36415 COLL VENOUS BLD VENIPUNCTURE: CPT

## 2024-11-15 PROCEDURE — 97161 PT EVAL LOW COMPLEX 20 MIN: CPT

## 2024-11-15 PROCEDURE — 2140000000 HC CCU INTERMEDIATE R&B

## 2024-11-15 PROCEDURE — 80048 BASIC METABOLIC PNL TOTAL CA: CPT

## 2024-11-15 PROCEDURE — 97535 SELF CARE MNGMENT TRAINING: CPT

## 2024-11-15 PROCEDURE — 97165 OT EVAL LOW COMPLEX 30 MIN: CPT

## 2024-11-15 PROCEDURE — 99232 SBSQ HOSP IP/OBS MODERATE 35: CPT | Performed by: INTERNAL MEDICINE

## 2024-11-15 PROCEDURE — 99231 SBSQ HOSP IP/OBS SF/LOW 25: CPT | Performed by: PHYSICIAN ASSISTANT

## 2024-11-15 RX ORDER — ESCITALOPRAM OXALATE 10 MG/1
10 TABLET ORAL DAILY
Status: DISPENSED | OUTPATIENT
Start: 2024-11-15

## 2024-11-15 RX ORDER — HYDROXYZINE HYDROCHLORIDE 10 MG/1
10 TABLET, FILM COATED ORAL 3 TIMES DAILY PRN
Status: DISPENSED | OUTPATIENT
Start: 2024-11-15

## 2024-11-15 RX ADMIN — ESCITALOPRAM OXALATE 10 MG: 10 TABLET ORAL at 15:44

## 2024-11-15 RX ADMIN — DIVALPROEX SODIUM 125 MG: 125 CAPSULE ORAL at 07:38

## 2024-11-15 RX ADMIN — ENOXAPARIN SODIUM 40 MG: 100 INJECTION SUBCUTANEOUS at 07:34

## 2024-11-15 RX ADMIN — METOPROLOL TARTRATE 25 MG: 25 TABLET, FILM COATED ORAL at 07:35

## 2024-11-15 RX ADMIN — SODIUM CHLORIDE, PRESERVATIVE FREE 10 ML: 5 INJECTION INTRAVENOUS at 07:35

## 2024-11-15 RX ADMIN — SODIUM CHLORIDE, PRESERVATIVE FREE 10 ML: 5 INJECTION INTRAVENOUS at 20:47

## 2024-11-15 RX ADMIN — LORAZEPAM 2 MG: 1 TABLET ORAL at 00:30

## 2024-11-15 RX ADMIN — METOPROLOL TARTRATE 25 MG: 25 TABLET, FILM COATED ORAL at 20:43

## 2024-11-15 RX ADMIN — DIVALPROEX SODIUM 125 MG: 125 CAPSULE ORAL at 16:57

## 2024-11-15 RX ADMIN — ONDANSETRON 4 MG: 4 TABLET, ORALLY DISINTEGRATING ORAL at 13:16

## 2024-11-15 RX ADMIN — MULTIVITAMIN TABLET 1 TABLET: TABLET at 07:35

## 2024-11-15 RX ADMIN — HYDROXYZINE HYDROCHLORIDE 10 MG: 10 TABLET ORAL at 20:43

## 2024-11-15 RX ADMIN — LORAZEPAM 2 MG: 2 INJECTION INTRAMUSCULAR; INTRAVENOUS at 20:43

## 2024-11-15 RX ADMIN — ATORVASTATIN CALCIUM 40 MG: 40 TABLET, FILM COATED ORAL at 07:35

## 2024-11-15 RX ADMIN — FOLIC ACID 1 MG: 1 TABLET ORAL at 07:35

## 2024-11-15 RX ADMIN — THIAMINE HYDROCHLORIDE 250 MG: 100 INJECTION, SOLUTION INTRAMUSCULAR; INTRAVENOUS at 23:13

## 2024-11-15 RX ADMIN — HYDROXYZINE HYDROCHLORIDE 10 MG: 10 TABLET ORAL at 12:31

## 2024-11-15 NOTE — PLAN OF CARE
Problem: Discharge Planning  Goal: Discharge to home or other facility with appropriate resources  11/15/2024 1626 by Clemencia Rao RN  Outcome: Progressing  11/15/2024 0810 by Osiel Pollard RN  Outcome: Progressing     Problem: Pain  Goal: Verbalizes/displays adequate comfort level or baseline comfort level  11/15/2024 1626 by Clemencia Rao RN  Outcome: Progressing  11/15/2024 0810 by Osiel Pollard RN  Outcome: Progressing     Problem: Safety - Adult  Goal: Free from fall injury  11/15/2024 1626 by Clemencia Rao RN  Outcome: Progressing  11/15/2024 0810 by Osiel Pollard RN  Outcome: Progressing     Problem: Skin/Tissue Integrity  Goal: Absence of new skin breakdown  Description: 1.  Monitor for areas of redness and/or skin breakdown  2.  Assess vascular access sites hourly  3.  Every 4-6 hours minimum:  Change oxygen saturation probe site  4.  Every 4-6 hours:  If on nasal continuous positive airway pressure, respiratory therapy assess nares and determine need for appliance change or resting period.  11/15/2024 1626 by Clemencia Rao RN  Outcome: Progressing  11/15/2024 0810 by Osiel Pollard RN  Outcome: Progressing     Problem: ABCDS Injury Assessment  Goal: Absence of physical injury  11/15/2024 1626 by Clemencia Rao RN  Outcome: Progressing  11/15/2024 0810 by Osiel Pollard RN  Outcome: Progressing     Problem: Confusion  Goal: Confusion, delirium, dementia, or psychosis is improved or at baseline  Description: INTERVENTIONS:  1. Assess for possible contributors to thought disturbance, including medications, impaired vision or hearing, underlying metabolic abnormalities, dehydration, psychiatric diagnoses, and notify attending LIP  2. [place] high risk fall precautions, as indicated  3. Provide frequent short contacts to provide reality reorientation, refocusing and direction  4. Decrease environmental stimuli, including noise as appropriate  5. Monitor and intervene to maintain

## 2024-11-15 NOTE — PROGRESS NOTES
[facility] Hospitalist Progress Note    SYNOPSIS: Patient admitted on 2024 for AMS (altered mental status)    Mr. Jason Fisher, a 64 y.o. year old male with past medical history of chronic alcoholism,  Chronic hyponatremia, seizure disorder, psychogenic polydipsia, anxiety, CAD s/p CABG, hypertension, hyperlipidemia, dementia, COPD who presented to the emergency department with altered mental status. Patient's brother reported that patient drinks excessive amounts of water and alcohol. Patient drinks 20 bottles of 16 ounces water a day  Patient had recent hospitalization in 2024 for seizure activity and hyponatremia. His labs were significant for sodium 121, lactic acid 4.6, CT head negative for acute finding, chest x-ray unremarkable, negative alcohol, placed on Precedex, received 1500 mg Keppra in the ED, admitted for further evaluation. Nephrology and neurology was consulted. Sodium began to improve with NS infusion. Troponin 48-->95-->145 on admission. EKG showed sinus tachycardia. Cardiology was consulted. There was concern for possible Wernicke's vs ETOH related dementia. Patietn was started on IV thiamine. ECHO was completed and largely unremarkable, no further cardiology workup needed. Hyponatremia resolved with fluids. TOM resolved with fluids. Patient was counseled on limiting fluid intake. MRI brain pending.     SUBJECTIVE:  Stable overnight. No other overnight issues reported.   Patient seen and examined  Records reviewed.     Temp (24hrs), Av.8 °F (36.6 °C), Min:97 °F (36.1 °C), Max:98.3 °F (36.8 °C)    DIET: ADULT DIET; Regular  CODE: Full Code    Intake/Output Summary (Last 24 hours) at 11/15/2024 0937  Last data filed at 2024 1839  Gross per 24 hour   Intake --   Output 700 ml   Net -700 ml       Review of Systems  All bolded are positive; please see HPI  General:  Fever, chills, diaphoresis, fatigue, malaise, night sweats, weight loss  Psychological:  Anxiety,

## 2024-11-15 NOTE — PLAN OF CARE
Problem: Safety - Medical Restraint  Goal: Remains free of injury from restraints (Restraint for Interference with Medical Device)  Description: INTERVENTIONS:  1. Determine that other, less restrictive measures have been tried or would not be effective before applying the restraint  2. Evaluate the patient's condition at the time of restraint application  3. Inform patient/family regarding the reason for restraint  4. Q2H: Monitor safety, psychosocial status, comfort, nutrition and hydration  11/14/2024 1910 by Parag Macias RN  Outcome: Progressing  11/14/2024 0915 by Osiel Pollard RN  Outcome: Progressing     Problem: Discharge Planning  Goal: Discharge to home or other facility with appropriate resources  11/14/2024 1910 by Parag Macias RN  Outcome: Progressing  11/14/2024 0915 by Osiel Pollard RN  Outcome: Progressing     Problem: Pain  Goal: Verbalizes/displays adequate comfort level or baseline comfort level  11/14/2024 1910 by Parag Macias RN  Outcome: Progressing  11/14/2024 0915 by Osiel Pollard RN  Outcome: Progressing     Problem: Safety - Adult  Goal: Free from fall injury  11/14/2024 1910 by aPrag Macias RN  Outcome: Progressing  11/14/2024 0915 by Osiel Pollard RN  Outcome: Progressing     Problem: Skin/Tissue Integrity  Goal: Absence of new skin breakdown  Description: 1.  Monitor for areas of redness and/or skin breakdown  2.  Assess vascular access sites hourly  3.  Every 4-6 hours minimum:  Change oxygen saturation probe site  4.  Every 4-6 hours:  If on nasal continuous positive airway pressure, respiratory therapy assess nares and determine need for appliance change or resting period.  11/14/2024 1910 by Parag Macias RN  Outcome: Progressing  11/14/2024 0915 by Osiel Pollard RN  Outcome: Progressing     Problem: ABCDS Injury Assessment  Goal: Absence of physical injury  11/14/2024 1910 by Parag Macias RN  Outcome: Progressing  11/14/2024 0915 by Atul

## 2024-11-15 NOTE — PROGRESS NOTES
Violent          Physical Examination  Vitals   Vitals:    11/14/24 1430 11/14/24 2021 11/14/24 2345 11/15/24 0715   BP: 103/66 110/62 120/73 117/72   Pulse: 76 93 79 70   Resp: 17 24 26 20   Temp: 97.6 °F (36.4 °C) 97 °F (36.1 °C) 98.3 °F (36.8 °C) 98.2 °F (36.8 °C)   TempSrc: Temporal Temporal Temporal Oral   SpO2: 95% 96% 97% 96%   Weight:       Height:            General: Patient appears in no acute distress.   HEENT: Normocephalic, atraumatic  Chest: no dyspnea  Heart: RRR  Extremities/Peripheral vascular: No edema/swelling noted.     Neurologic Examination    Mental Status  Pt was oriented to self, hospital (not which), year, month. Not situation. Pt was following commands. Speech appropriate with intact comprehension.     Cranial Nerves  II. Visual fields full to confrontation bilaterally.   III, IV, VI: Pupils equally round and reactive to light, 3 to 2 mm bilaterally.   EOMs: full, no nystagmus.   V. Facial sensation intact to light touch bilaterally  VII: Facial movements symmetric and strong  VIII: Hearing intact to voice  IX,X: No dysarthria  XI: Sternocleidomastoid and trapezius 5/5 bilaterally     Motor     Right Left   Right Left   Deltoid 5 5   5 5   Biceps 5 5   5 5   Triceps 5 5   5 5   Handgrip 5 5   5 5        5 5       Bulk: Normal in all four limbs with no evidence of atrophy    Pronator drift: absent bilaterally    Sensation  Light Touch: Intact distally in all four limbs      Coordination  Rapid alternating movements normal in bilateral upper extremities  Finger to nose testing normal bilaterally    Gait  No able to assess as pt not cooperative    Deferred for safety/fall consideration      Labs  Recent Labs     11/13/24  0025 11/13/24  0619 11/13/24  2330 11/15/24  0624   * 125*   < > 138   K  --  4.2   < > 4.0   CL  --  92*   < > 106   CO2  --  18*   < > 22   BUN  --  13   < > 12   CREATININE  --  1.4*   < > 1.2   GLUCOSE  --  80   < > 100*   CALCIUM  --  8.8   < > 8.7   BILITOT  --   0.7  --   --    ALKPHOS  --  87  --   --    AST  --  75*  --   --    ALT  --  24  --   --    WBC  --  11.9*  --   --    RBC  --  4.69  --   --    HGB  --  14.0  --   --    HCT  --  41.2  --   --    MCV  --  87.8  --   --    MCH  --  29.9  --   --    MCHC  --  34.0  --   --    RDW  --  12.8  --   --    PLT  --  292  --   --    MPV  --  9.4  --   --    LACTA 3.1*  --   --   --     < > = values in this interval not displayed.         Imaging  US RETROPERITONEAL COMPLETE   Final Result   1. 2.2 cm dilated right renal calyx versus simple cyst.  Six-month follow-up   recommended.   2. Otherwise unremarkable ultrasound of the kidneys and urinary bladder.         CT Head W/O Contrast   Final Result   1. No acute intracranial abnormality.   2. Unchanged chronic bifrontal encephalomalacia, left greater than right.         XR CHEST PORTABLE   Final Result   No acute process.         MRI BRAIN WO CONTRAST    (Results Pending)       I have personally reviewed all available.     Electronically signed by CHARLEE Santos on 11/15/2024 at 11:22 AM

## 2024-11-15 NOTE — PROGRESS NOTES
Occupational Therapy    OCCUPATIONAL THERAPY INITIAL EVALUATION    [facility]  [address] [place], [place]        Date:11/15/2024                                                  Patient Name: Jason Fisher    MRN: [id number]    : 1960    Room: Replaced by [id number]          Evaluating OT: Vivi Griffith, CATIAD, OTR/L; [id number]      Occupational therapy physician order:   Start   Ordering Provider    24  OT eval and treat  Start:  24 143,   End:  24 143,   ONE TIME,   Standing Count:  1 Occurrences   R         Migue Mann MD          Pt presents to ED with hyponatremia and AMS.       Diagnosis:    1. Hyponatremia    2. Psychogenic polydipsia    3. Elevated lactic acid level    4. Elevated troponin    5. Acute coronary syndrome (HCC)       Patient Active Problem List   Diagnosis    AMS (altered mental status)    Hyponatremia    Elevated troponin          Pertinent Medical History: No past medical history on file.       Surgery/Procedures: none this admission        Recommended Adaptive Equipment: TBD        Precautions:  Fall Risk, telesitter, cognition, +alarms, anxiety      Assessment of current deficits    [x] Functional mobility  [x]ADLs  [x] Strength               [x]Cognition    [x] Functional transfers   [x] IADLs         [x] Safety Awareness   [x]Endurance    [x] Fine Coordination              [x] Balance      [] Vision/perception   []Sensation     []Gross Motor Coordination  [] ROM  [] Delirium                   [] Motor Control     OT PLAN OF CARE   OT POC based on physician orders, patient diagnosis and results of clinical assessment    Frequency/Duration 1-3 days/wk for 2 weeks PRN   Specific OT Treatment Interventions to include:   * Instruction/training on adapted ADL techniques and AE recommendations to increase functional independence within precautions       * Training on energy conservation strategies,  noted during ADL tasks          Fine Motor Coordination:  WFL  Hearing: WFL   Sensation:  no c/o numbness or tingling    Tone:  WFL    Edema: unremarkable      Comment: RN cleared patient for OT.  Upon arrival, patient was supine in bed and agreeable to OT session.  brother present throughout session . At end of session, patient sitting in chair with arms , alarm activated , and RN aware ; with call light and phone within reach; all lines and tubes intact.   Patient presents with decreased safety awareness, activity tolerance , balance , coordination, and cognition. Pt demonstrated decreased independence during ADLs, bed mobility , functional transfers, and functional mobility. Pt would benefit from continued skilled OT to increase safety and independence with completion of ADL tasks and functional mobility for improved quality of life and return to PLOF.       Treatment: OT treatment provided this date includes:   ADL-  Instruction/training on safety and adapted techniques for completion of ADLs: Therapist facilitated & pt educated on activity modifications/adaptations to promote implementation of fall prevention strategies, EC/WS strategies, & safety awareness throughout ADLs.   Mobility-  Instruction/training on safety and improved independence with bed mobility/functional transfers and functional mobility.   Activity tolerance- Instruction/training on energy conservation/work simplification for completion of ADLs:.     Neuromuscular Reeducation to facilitate balance/righting reactions for increased function with ADLs tasks:    Cognitive retraining -  Cues for safety, sequencing, and problem solving    Skilled positioning/alignment-  Therapist facilitated proper positioning/alignment throughout session to maintain skin/joint integrity & proper body mechanics.   Skilled monitoring of vitals- To maximize safe participation throughout functional activities.     Pt appeared to have tolerated session well and appears

## 2024-11-15 NOTE — PROGRESS NOTES
Physical Therapy  Physical Therapy Initial Assessment     Name: Jason Fisher  : 1960  MRN: [id number]      Date of Service: 11/15/2024    Evaluating PT:  Laureen Morgan, PT, DPT [id number]    Room #:  6409/6409-B  Diagnosis:  Hyponatremia [E87.1]  Psychogenic polydipsia [R63.1, F54]  Elevated troponin [R79.89]  Elevated lactic acid level [R79.89]  AMS (altered mental status) [R41.82]  PMHx/PSHx:   has no past medical history on file.  Procedure/Surgery:  None this admission  Precautions:  Fall risk, alarms, cognition, Hx of dementia, TSM  Equipment Needs:  TBD    SUBJECTIVE:    Pt lives with his brother in a 1 story home with 3 steps to enter and one \"wobbly\" rail(s). Pt ambulated with no device PTA. He owns a SPC and a FWW.    OBJECTIVE:   Initial Evaluation  Date: 11/15/24 Treatment Date:  Short Term/ Long Term   Goals   AM-PAC 6 Clicks      Was pt agreeable to Eval/treatment? Yes     Does pt have pain? Denied     Bed Mobility  Rolling: NT  Supine to sit: SBA  Sit to supine: NT  Scooting: SBA  Rolling: Independent  Supine to sit: Independent  Sit to supine: Independent  Scooting: Independent   Transfers Sit to stand: Robyn  Stand to sit: Robyn  Stand pivot: Robyn with no device  Sit to stand: Independent  Stand to sit: Independent  Stand pivot: Mod I with AAD   Ambulation    60 feet x3 with no device and Robyn  >400 feet with AAD and Mod I   Stair negotiation: ascended and descended NT  >3 steps with unilateral rail and Mod I   ROM BUE:  Refer to OT  BLE:  WFL     Strength BUE:  Refer to OT  BLE:  5/5 knee extension, 5/5 ankle DF, 5/5 ankle PF, 5/5 knee flexion     Balance Sitting EOB:  SBA  Dynamic Standing:  Robyn with no device  Sitting EOB:  Independent  Dynamic Standing:  Mod I with AAD     Pt is A & O x 2 (Pt was oriented to self and place, disoriented to month, year, and situation)  Sensation:  Pt denies numbness and tingling to extremities  Edema:  Unremarkable     Therapeutic Exercises:  Dynamic  balance activities while sitting EOB for ~10 minutes, gait training, transfer training     Patient education  Pt educated on role of PT in acute setting, benefits of upright mobility, use of call light in room, safety.    Patient response to education:   Pt verbalized understanding Pt demonstrated skill Pt requires further education in this area   Yes Partially  Reinforce     ASSESSMENT:    Conditions Requiring Skilled Therapeutic Intervention:    [x]Decreased strength     [x]Decreased ROM  [x]Decreased functional mobility  [x]Decreased balance   [x]Decreased endurance   [x]Decreased posture  [x]Decreased sensation  [x]Decreased coordination   []Decreased vision  [x]Decreased safety awareness   [x]Increased pain       Comments:  Pt was supine in bed upon therapist arrival, agreeable to session. Pt was cooperative. Patient participated in mobility as documented above. Bed mobility revealed good trunk control and no dizziness was reported with positional change from sup>sitting EOB. Most notable deficits this date during upright mobility included the following: decreased gait speed, postural instability, guarded posture, and difficulty with obstacle negotiation. After session, patient was assisted into chair and was left upright with all needs met, family present, TSM present, chair alarm on, questions answered, and call light within reach.     Treatment:  Patient practiced and was instructed in the following treatment:    Bed mobility - Cues provided for sequencing, physical assist provided as needed.  Transfers - Cues provided for safety, hand/feet placement, physical assist provided as needed.  Ambulation - Cues provided for negotiation of obstacles, physical assist provided as needed.  Skilled positioning - To prevent skin breakdown and contractures.  Skilled assessment of vitals.  Education - Provided for safety, role of PT in acute setting, benefits of upright mobility.    Pt's/ family goals   1. Return to

## 2024-11-15 NOTE — PROGRESS NOTES
Per brother Fletcher who cares for patient states this mentation is about his baseline, has panic attacks at home when ever he is left alone.

## 2024-11-15 NOTE — PROGRESS NOTES
[facility]  MD Jorge Garay, MD Mely Sesay, CNP   Pushpa Gutierrez, KAITLYNN Shah, KALIN  Progress Note    11/15/2024    SUBJECTIVE:   11/14: Sitting up in bed, no acute distress. He is eating his lunch. He is alert to self and place. Vital signs are stable. Per his brother whom is present at the bedside, the patient drinks very large quantities of water daily.     11/15: Sitting up in bed, somewhat confused. Sitter present at bedside.  MRI ordered. He is having some nausea today. Denies dyspnea, chest pain, or palpitations. Vital signs are stable.     PROBLEM LIST:    Principal Problem:    AMS (altered mental status)  Active Problems:    Hyponatremia    Elevated troponin  Resolved Problems:    * No resolved hospital problems. *         DIET:    ADULT DIET; Regular     MEDS (scheduled):    divalproex  125 mg Oral BID    nicotine  1 patch TransDERmal Daily    metoprolol tartrate  25 mg Oral BID    atorvastatin  40 mg Oral Daily    sodium chloride flush  5-40 mL IntraVENous 2 times per day    enoxaparin  40 mg SubCUTAneous Daily    sodium chloride flush  5-40 mL IntraVENous 2 times per day    thiamine  250 mg IntraVENous Q24H    Followed by    [START ON 11/19/2024] thiamine  100 mg Oral Daily    multivitamin  1 tablet Oral Daily    folic acid  1 mg Oral Daily       MEDS (infusions):   sodium chloride         MEDS (prn):  hydrOXYzine HCl, hydrALAZINE, perflutren lipid microspheres, sodium chloride flush, sodium chloride, potassium chloride **OR** potassium alternative oral replacement **OR** potassium chloride, magnesium sulfate, ondansetron **OR** ondansetron, polyethylene glycol, acetaminophen **OR** acetaminophen, sodium chloride flush, LORazepam **OR** LORazepam **OR** LORazepam **OR** LORazepam **OR** LORazepam **OR** LORazepam **OR** LORazepam **OR** LORazepam    PHYSICAL EXAM:     Patient Vitals for the past 24 hrs:   BP Temp Temp src Pulse Resp  volume/intravascular volume depletion and hypotension  Lactic acidosis   Hypertensive urgency  Acute encephalopathy  Psychogenic polydipsia  Alcoholism     Na 125-->133 mmol/L in roughly 12 hours   Na is stable 137   Azotemia improved -- 1.1 mg/dL     Plan  Stop D5W  Follow labs  Monitor I&O  Avoid nephrotoxins   Encourage PO intake   Importance of limiting water intake discussed with him   Rate of Na correction no greater that 6-8 mmol/24 hours  Continue supportive care      Electronically signed by ANNIA Mccormick CNP on 11/15/2024 at 2:33 PM

## 2024-11-15 NOTE — PROGRESS NOTES
VSC unsuccessful. Patient bed alarming frequently, VSC alarming frequently. Patient at risk for falls and unable to be redirected by VSC at this time. Bedside  re-initiated for fall risk.    CAMACHO GREY RN

## 2024-11-15 NOTE — PROGRESS NOTES
[facility]  MD Jorge Garay, MD Mely Sesay, CNP   Pushpa Gutierrez, ANP  Dulce Shah, KALIN  Progress Note    11/14/2024    SUBJECTIVE:   11/14: Sitting up in bed, no acute distress. He is eating his lunch. He is alert to self and place. Vital signs are stable. Per his brother whom is present at the bedside, the patient drinks very large quantities of water daily.     PROBLEM LIST:    Principal Problem:    AMS (altered mental status)  Active Problems:    Hyponatremia    Elevated troponin  Resolved Problems:    * No resolved hospital problems. *         DIET:    ADULT DIET; Regular     MEDS (scheduled):    divalproex  125 mg Oral BID    nicotine  1 patch TransDERmal Daily    metoprolol tartrate  25 mg Oral BID    atorvastatin  40 mg Oral Daily    sodium chloride flush  5-40 mL IntraVENous 2 times per day    enoxaparin  40 mg SubCUTAneous Daily    sodium chloride flush  5-40 mL IntraVENous 2 times per day    thiamine  250 mg IntraVENous Q24H    Followed by    [START ON 11/19/2024] thiamine  100 mg Oral Daily    multivitamin  1 tablet Oral Daily    folic acid  1 mg Oral Daily       MEDS (infusions):   dextrose 75 mL/hr at 11/14/24 1823    sodium chloride      [Held by provider] sodium chloride Stopped (11/14/24 1045)       MEDS (prn):  hydrALAZINE, perflutren lipid microspheres, sodium chloride flush, sodium chloride, potassium chloride **OR** potassium alternative oral replacement **OR** potassium chloride, magnesium sulfate, ondansetron **OR** ondansetron, polyethylene glycol, acetaminophen **OR** acetaminophen, sodium chloride flush, LORazepam **OR** LORazepam **OR** LORazepam **OR** LORazepam **OR** LORazepam **OR** LORazepam **OR** LORazepam **OR** LORazepam    PHYSICAL EXAM:     Patient Vitals for the past 24 hrs:   BP Temp Temp src Pulse Resp SpO2 Height   11/14/24 1430 103/66 97.6 °F (36.4 °C) Temporal 76 17 95 % --   11/14/24 1147 108/60 98 °F  (36.7 °C) Temporal 72 25 96 % --   11/14/24 0930 -- -- -- -- -- -- 1.778 m (5' 10\")   11/14/24 0800 109/63 97.4 °F (36.3 °C) Temporal 73 18 97 % --   11/14/24 0530 111/64 -- -- 70 18 97 % --   11/14/24 0215 (!) 88/61 98 °F (36.7 °C) Oral 64 18 96 % --   11/13/24 2254 (!) 90/54 97.6 °F (36.4 °C) Temporal 76 18 97 % --   @      Intake/Output Summary (Last 24 hours) at 11/14/2024 2007  Last data filed at 11/14/2024 1839  Gross per 24 hour   Intake --   Output 700 ml   Net -700 ml         Wt Readings from Last 3 Encounters:   11/13/24 64 kg (141 lb)   09/25/24 74.8 kg (165 lb)       Constitutional:  in no acute distress  HEENT: NC/AT, EOMI, sclera and conjunctiva are clear and anicteric, mucus membranes moist  Neck: Trachea midline, no JVD  Cardiovascular: S1, S2 regular rhythm, no murmur,or rub  Respiratory:  No crackles, no wheeze  Gastrointestinal:  Soft, nontender, nondistended, NABS  Ext: no edema, feet warm  Skin: dry, no rash  Neuro: awake, alert, interactive      DATA:    Recent Labs     11/12/24  1842 11/13/24  0619   WBC 18.2* 11.9*   HGB 15.7 14.0   HCT 45.8 41.2   MCV 87.4 87.8    292     Recent Labs     11/12/24  1842 11/12/24  2152 11/13/24  0619 11/13/24  2330 11/14/24  1842   *   < > 125* 133 137   K 4.0   < > 4.2 4.4 4.1   CL 84*   < > 92* 104 107   CO2 18*   < > 18* 18* 20*   MG 1.5*  --   --   --   --    BUN 10   < > 13 17 17   CREATININE 1.0   < > 1.4* 1.5* 1.3*   ALT 21  --  24  --   --    AST 42*  --  75*  --   --    BILIDIR <0.2  --  <0.2  --   --    BILITOT 0.8  --  0.7  --   --    ALKPHOS 113  --  87  --   --     < > = values in this interval not displayed.       No results found for: \"LABPROT\"      Assessment  Hyponatremia in the setting of decreased circulating volume/intravascular volume depletion as reflected for urine sodium/chloride levels <20  Acute kidney Injury precipitated by decreased circulating volume/intravascular volume depletion and hypotension  Lactic acidosis

## 2024-11-15 NOTE — PLAN OF CARE
Problem: Safety - Medical Restraint  Goal: Remains free of injury from restraints (Restraint for Interference with Medical Device)  Description: INTERVENTIONS:  1. Determine that other, less restrictive measures have been tried or would not be effective before applying the restraint  2. Evaluate the patient's condition at the time of restraint application  3. Inform patient/family regarding the reason for restraint  4. Q2H: Monitor safety, psychosocial status, comfort, nutrition and hydration  11/15/2024 0810 by Osiel Pollard RN  Outcome: Progressing  11/14/2024 1910 by Parag Macias RN  Outcome: Progressing     Problem: Discharge Planning  Goal: Discharge to home or other facility with appropriate resources  11/15/2024 0810 by Osiel Pollard RN  Outcome: Progressing  11/14/2024 1910 by Parag Macias RN  Outcome: Progressing     Problem: Pain  Goal: Verbalizes/displays adequate comfort level or baseline comfort level  11/15/2024 0810 by Osiel Pollard RN  Outcome: Progressing  11/14/2024 1910 by Parag Macias RN  Outcome: Progressing     Problem: Safety - Adult  Goal: Free from fall injury  11/15/2024 0810 by Osiel Pollard RN  Outcome: Progressing  11/14/2024 1910 by Parag Macias RN  Outcome: Progressing     Problem: Skin/Tissue Integrity  Goal: Absence of new skin breakdown  Description: 1.  Monitor for areas of redness and/or skin breakdown  2.  Assess vascular access sites hourly  3.  Every 4-6 hours minimum:  Change oxygen saturation probe site  4.  Every 4-6 hours:  If on nasal continuous positive airway pressure, respiratory therapy assess nares and determine need for appliance change or resting period.  11/15/2024 0810 by Osiel Pollard RN  Outcome: Progressing  11/14/2024 1910 by Parag Macias RN  Outcome: Progressing     Problem: ABCDS Injury Assessment  Goal: Absence of physical injury  11/15/2024 0810 by Osiel Pollard RN  Outcome: Progressing  11/14/2024 1910 by Gilberto

## 2024-11-16 ENCOUNTER — APPOINTMENT (OUTPATIENT)
Dept: MRI IMAGING | Age: 64
DRG: 640 | End: 2024-11-16
Payer: MEDICARE

## 2024-11-16 PROBLEM — G93.41 ACUTE METABOLIC ENCEPHALOPATHY: Status: ACTIVE | Noted: 2024-11-16

## 2024-11-16 LAB
ANION GAP SERPL CALCULATED.3IONS-SCNC: 11 MMOL/L (ref 7–16)
ANION GAP SERPL CALCULATED.3IONS-SCNC: 12 MMOL/L (ref 7–16)
BUN SERPL-MCNC: 10 MG/DL (ref 6–23)
BUN SERPL-MCNC: 10 MG/DL (ref 6–23)
CALCIUM SERPL-MCNC: 9.5 MG/DL (ref 8.6–10.2)
CALCIUM SERPL-MCNC: 9.6 MG/DL (ref 8.6–10.2)
CHLORIDE SERPL-SCNC: 104 MMOL/L (ref 98–107)
CHLORIDE SERPL-SCNC: 105 MMOL/L (ref 98–107)
CO2 SERPL-SCNC: 24 MMOL/L (ref 22–29)
CO2 SERPL-SCNC: 25 MMOL/L (ref 22–29)
CREAT SERPL-MCNC: 1.1 MG/DL (ref 0.7–1.2)
CREAT SERPL-MCNC: 1.2 MG/DL (ref 0.7–1.2)
EKG ATRIAL RATE: 113 BPM
EKG P AXIS: 46 DEGREES
EKG P-R INTERVAL: 180 MS
EKG Q-T INTERVAL: 340 MS
EKG QRS DURATION: 96 MS
EKG QTC CALCULATION (BAZETT): 466 MS
EKG R AXIS: 39 DEGREES
EKG T AXIS: 67 DEGREES
EKG VENTRICULAR RATE: 113 BPM
GFR, ESTIMATED: 66 ML/MIN/1.73M2
GFR, ESTIMATED: 73 ML/MIN/1.73M2
GLUCOSE SERPL-MCNC: 101 MG/DL (ref 74–99)
GLUCOSE SERPL-MCNC: 124 MG/DL (ref 74–99)
MAGNESIUM SERPL-MCNC: 2.1 MG/DL (ref 1.6–2.6)
PHOSPHATE SERPL-MCNC: 4.1 MG/DL (ref 2.5–4.5)
POTASSIUM SERPL-SCNC: 4.1 MMOL/L (ref 3.5–5)
POTASSIUM SERPL-SCNC: 4.3 MMOL/L (ref 3.5–5)
SODIUM SERPL-SCNC: 140 MMOL/L (ref 132–146)
SODIUM SERPL-SCNC: 141 MMOL/L (ref 132–146)

## 2024-11-16 PROCEDURE — 2580000003 HC RX 258: Performed by: INTERNAL MEDICINE

## 2024-11-16 PROCEDURE — 70551 MRI BRAIN STEM W/O DYE: CPT

## 2024-11-16 PROCEDURE — 6370000000 HC RX 637 (ALT 250 FOR IP): Performed by: INTERNAL MEDICINE

## 2024-11-16 PROCEDURE — 80048 BASIC METABOLIC PNL TOTAL CA: CPT

## 2024-11-16 PROCEDURE — 84100 ASSAY OF PHOSPHORUS: CPT

## 2024-11-16 PROCEDURE — 2140000000 HC CCU INTERMEDIATE R&B

## 2024-11-16 PROCEDURE — 99232 SBSQ HOSP IP/OBS MODERATE 35: CPT | Performed by: NURSE PRACTITIONER

## 2024-11-16 PROCEDURE — 6360000002 HC RX W HCPCS: Performed by: INTERNAL MEDICINE

## 2024-11-16 PROCEDURE — 36415 COLL VENOUS BLD VENIPUNCTURE: CPT

## 2024-11-16 PROCEDURE — 83735 ASSAY OF MAGNESIUM: CPT

## 2024-11-16 PROCEDURE — 99232 SBSQ HOSP IP/OBS MODERATE 35: CPT | Performed by: INTERNAL MEDICINE

## 2024-11-16 RX ADMIN — MULTIVITAMIN TABLET 1 TABLET: TABLET at 09:37

## 2024-11-16 RX ADMIN — LORAZEPAM 2 MG: 1 TABLET ORAL at 17:48

## 2024-11-16 RX ADMIN — ATORVASTATIN CALCIUM 40 MG: 40 TABLET, FILM COATED ORAL at 09:35

## 2024-11-16 RX ADMIN — LORAZEPAM 1 MG: 1 TABLET ORAL at 11:54

## 2024-11-16 RX ADMIN — METOPROLOL TARTRATE 25 MG: 25 TABLET, FILM COATED ORAL at 21:13

## 2024-11-16 RX ADMIN — THIAMINE HYDROCHLORIDE 250 MG: 100 INJECTION, SOLUTION INTRAMUSCULAR; INTRAVENOUS at 21:28

## 2024-11-16 RX ADMIN — SODIUM CHLORIDE, PRESERVATIVE FREE 10 ML: 5 INJECTION INTRAVENOUS at 09:46

## 2024-11-16 RX ADMIN — LORAZEPAM 2 MG: 1 TABLET ORAL at 21:28

## 2024-11-16 RX ADMIN — ESCITALOPRAM OXALATE 10 MG: 10 TABLET ORAL at 09:35

## 2024-11-16 RX ADMIN — DIVALPROEX SODIUM 125 MG: 125 CAPSULE ORAL at 17:48

## 2024-11-16 RX ADMIN — SODIUM CHLORIDE, PRESERVATIVE FREE 10 ML: 5 INJECTION INTRAVENOUS at 09:45

## 2024-11-16 RX ADMIN — HYDROXYZINE HYDROCHLORIDE 10 MG: 10 TABLET ORAL at 17:53

## 2024-11-16 RX ADMIN — DIVALPROEX SODIUM 125 MG: 125 CAPSULE ORAL at 09:35

## 2024-11-16 RX ADMIN — HYDROXYZINE HYDROCHLORIDE 10 MG: 10 TABLET ORAL at 11:54

## 2024-11-16 RX ADMIN — METOPROLOL TARTRATE 25 MG: 25 TABLET, FILM COATED ORAL at 09:35

## 2024-11-16 RX ADMIN — ENOXAPARIN SODIUM 40 MG: 100 INJECTION SUBCUTANEOUS at 09:36

## 2024-11-16 RX ADMIN — FOLIC ACID 1 MG: 1 TABLET ORAL at 09:35

## 2024-11-16 RX ADMIN — SODIUM CHLORIDE, PRESERVATIVE FREE 10 ML: 5 INJECTION INTRAVENOUS at 21:29

## 2024-11-16 NOTE — PLAN OF CARE
Problem: Safety - Adult  Goal: Free from fall injury  11/16/2024 0004 by Sheryl Kohler RN  Outcome: Not Progressing  11/15/2024 1626 by Clemencia Rao RN  Outcome: Progressing     Problem: Confusion  Goal: Confusion, delirium, dementia, or psychosis is improved or at baseline  Description: INTERVENTIONS:  1. Assess for possible contributors to thought disturbance, including medications, impaired vision or hearing, underlying metabolic abnormalities, dehydration, psychiatric diagnoses, and notify attending LIP  2. [place] high risk fall precautions, as indicated  3. Provide frequent short contacts to provide reality reorientation, refocusing and direction  4. Decrease environmental stimuli, including noise as appropriate  5. Monitor and intervene to maintain adequate nutrition, hydration, elimination, sleep and activity  6. If unable to ensure safety without constant attention obtain sitter and review sitter guidelines with assigned personnel  7. Initiate Psychosocial CNS and Spiritual Care consult, as indicated  11/16/2024 0004 by Sheryl Kohler, RN  Outcome: Not Progressing  11/15/2024 1626 by Clemencia Rao, RN  Outcome: Progressing

## 2024-11-16 NOTE — PROGRESS NOTES
[facility]  MD Jorge Garay MD Ali Hassan, MD Lisa Kniska, CNP   Pushpa Gutierrez, KAITLYNN Shah, KALIN  Progress Note    11/16/2024    SUBJECTIVE:   11/14: Sitting up in bed, no acute distress. He is eating his lunch. He is alert to self and place. Vital signs are stable. Per his brother whom is present at the bedside, the patient drinks very large quantities of water daily.     11/15: Sitting up in bed, somewhat confused. Sitter present at bedside.  MRI ordered. He is having some nausea today. Denies dyspnea, chest pain, or palpitations. Vital signs are stable.     11/16: Resting comfortably watching television.  Breathing comfortably on room air.  Asking for more water.  Sitter at bedside notes no new issues.    PROBLEM LIST:    Principal Problem:    AMS (altered mental status)  Active Problems:    Hyponatremia    Elevated troponin    Acute metabolic encephalopathy  Resolved Problems:    * No resolved hospital problems. *         DIET:    ADULT DIET; Regular     MEDS (scheduled):    escitalopram  10 mg Oral Daily    divalproex  125 mg Oral BID    nicotine  1 patch TransDERmal Daily    metoprolol tartrate  25 mg Oral BID    atorvastatin  40 mg Oral Daily    sodium chloride flush  5-40 mL IntraVENous 2 times per day    enoxaparin  40 mg SubCUTAneous Daily    sodium chloride flush  5-40 mL IntraVENous 2 times per day    thiamine  250 mg IntraVENous Q24H    Followed by    [START ON 11/19/2024] thiamine  100 mg Oral Daily    multivitamin  1 tablet Oral Daily    folic acid  1 mg Oral Daily       MEDS (infusions):   sodium chloride         MEDS (prn):  hydrOXYzine HCl, hydrALAZINE, perflutren lipid microspheres, sodium chloride flush, sodium chloride, potassium chloride **OR** potassium alternative oral replacement **OR** potassium chloride, magnesium sulfate, ondansetron **OR** ondansetron, polyethylene glycol, acetaminophen **OR** acetaminophen, sodium chloride  flush, LORazepam **OR** LORazepam **OR** LORazepam **OR** LORazepam **OR** LORazepam **OR** LORazepam **OR** LORazepam **OR** LORazepam    PHYSICAL EXAM:     Patient Vitals for the past 24 hrs:   BP Temp Temp src Pulse Resp SpO2   11/16/24 1440 119/74 97.3 °F (36.3 °C) Temporal 68 19 97 %   11/16/24 1200 99/62 -- -- 62 -- --   11/16/24 1120 99/62 97.1 °F (36.2 °C) Temporal 62 18 96 %   11/16/24 0935 136/72 -- -- 62 -- --   11/16/24 0807 136/72 97.1 °F (36.2 °C) Temporal 62 18 99 %   11/16/24 0407 (!) 152/92 97.5 °F (36.4 °C) Temporal 66 20 99 %   11/15/24 2300 (!) 127/47 -- -- 62 18 100 %   11/15/24 1933 (!) 148/86 97.5 °F (36.4 °C) Temporal 79 22 94 %   11/15/24 1539 (!) 150/93 97.8 °F (36.6 °C) Temporal 79 21 100 %   @    No intake or output data in the 24 hours ending 11/16/24 1520        Wt Readings from Last 3 Encounters:   11/13/24 64 kg (141 lb)   09/25/24 74.8 kg (165 lb)       Constitutional:  in no acute distress  HEENT: NC/AT, EOMI, sclera and conjunctiva are clear and anicteric, mucus membranes moist  Neck: Trachea midline, no JVD  Cardiovascular: S1, S2 regular rhythm, no murmur,or rub  Respiratory:  No crackles, no wheeze  Gastrointestinal:  Soft, nontender, nondistended, NABS  Ext: no edema, feet warm  Skin: dry, no rash  Neuro: awake, alert, interactive      DATA:    No results for input(s): \"WBC\", \"HGB\", \"HCT\", \"MCV\", \"PLT\" in the last 72 hours.    Recent Labs     11/15/24  1810 11/16/24  0719 11/16/24  1115    141 140   K 3.9 4.3 4.1    105 104   CO2 20* 24 25   MG  --  2.1  --    PHOS  --  4.1  --    BUN 10 10 10   CREATININE 1.1 1.2 1.1       No results found for: \"LABPROT\"      Assessment  Hyponatremia in the setting of decreased circulating volume/intravascular volume depletion as reflected for urine sodium/chloride levels <20  Acute kidney Injury precipitated by decreased circulating volume/intravascular volume depletion and hypotension  Lactic acidosis   Hypertensive

## 2024-11-16 NOTE — PROGRESS NOTES
Pt very agitated and continues to rip telemetry off. Pt educated on the importance of monitoring HR and rhythm while in the hospital however to evidence of learning was noted, pt is a&ox2 baseline. On-call resident and CMU notified. Plan of care remains on-going.

## 2024-11-16 NOTE — PROGRESS NOTES
[facility]  Neurology follow up     Date:  11/16/2024  Patient Name:  Jason Fisher  YOB: 1960  MRN: [id number]     PCP:  Wes Lala MD   Referring:  No ref. provider found    Chief Complaint: AMS    History obtained from: chart review    History of Present Illness:  Jason Fisher is a 64 y.o. right handed male with past medical history of chronic alcoholism, chronic hyponatremia, seizure disorder, psychogenic polydipsia, anxiety, CAD status post CABG, hypertension, hyperlipidemia, dementia, COPD presenting for evaluation of AMS. Initally in the ED patient was agitated, did not follow commands. Reportedly brother called EMS after patient was found confused.  Brother reported no falls, head trauma, chest pain, nausea vomiting diarrhea. Reportedly patient excessively drinks water, and alcohol.  No seizure activity noted.  Patient had recent hospitalization in September 2024 for seizure activity with hyponatremia.    Initial workup showed hyponatremia, elevated lactic acid 4.6>3.1, leukocytosis, elevated troponins 48>95>145.  EKG no ST changes, CXR unremarkable, UDS and SDS negative     CT head showed no acute intracranial abnormality.  Unchanged chronic bifrontal encephalomalacia left greater than right.  Valproate level less than 3    On 11/13 pt agitated given midazolam 2 x2, droperidol 5mg, ketamine 55mg x2, ativan 2mg, 3mg x2 and was on a precedex drip;  Used to be on depakote 125mg BID.    Patient just returned to unit after having MRI, personal review no acute pathology.  At present time patient is alert and oriented x 3, confused to time.  There has been no reported seizure activity.  Patient does have a sitter at bedside as well as a telemetry sitter    No family at bedside     Allergies:      Allergies   Allergen Reactions    Haldol [Haloperidol] Other (See Comments)     Violent          Physical Examination  Vitals   Vitals:    11/15/24 2300 11/16/24

## 2024-11-16 NOTE — PROGRESS NOTES
Spoke with Fletcher who is the patients emergency contact and he said that we can tell the sister rc any information.

## 2024-11-16 NOTE — PROGRESS NOTES
[facility] Hospitalist Progress Note    SYNOPSIS: Patient admitted on 2024 for AMS (altered mental status)    Mr. Jason Fisher, a 64 y.o. year old male with past medical history of chronic alcoholism,  Chronic hyponatremia, seizure disorder, psychogenic polydipsia, anxiety, CAD s/p CABG, hypertension, hyperlipidemia, dementia, COPD who presented to the emergency department with altered mental status. Patient's brother reported that patient drinks excessive amounts of water and alcohol. Patient drinks 20 bottles of 16 ounces water a day  Patient had recent hospitalization in 2024 for seizure activity and hyponatremia. His labs were significant for sodium 121, lactic acid 4.6, CT head negative for acute finding, chest x-ray unremarkable, negative alcohol, placed on Precedex, received 1500 mg Keppra in the ED, admitted for further evaluation. Nephrology and neurology was consulted. Sodium began to improve with NS infusion. Troponin 48-->95-->145 on admission. EKG showed sinus tachycardia. Cardiology was consulted. There was concern for possible Wernicke's vs ETOH related dementia. Patietn was started on IV thiamine. ECHO was completed and largely unremarkable, no further cardiology workup needed. Hyponatremia resolved with fluids. TOM resolved with fluids. Patient was counseled on limiting fluid intake. MRI brain pending.     SUBJECTIVE:  Stable overnight. No other overnight issues reported.   Patient seen and examined  Records reviewed.     Temp (24hrs), Av.7 °F (36.5 °C), Min:97.1 °F (36.2 °C), Max:98.6 °F (37 °C)    DIET: ADULT DIET; Regular  CODE: Full Code  No intake or output data in the 24 hours ending 24 1027      Review of Systems  All bolded are positive; please see HPI  General:  Fever, chills, diaphoresis, fatigue, malaise, night sweats, weight loss  Psychological:  Anxiety, disorientation, hallucinations.  ENT:  Epistaxis, headaches, vertigo, visual  Medications    escitalopram  10 mg Oral Daily    divalproex  125 mg Oral BID    nicotine  1 patch TransDERmal Daily    metoprolol tartrate  25 mg Oral BID    atorvastatin  40 mg Oral Daily    sodium chloride flush  5-40 mL IntraVENous 2 times per day    enoxaparin  40 mg SubCUTAneous Daily    sodium chloride flush  5-40 mL IntraVENous 2 times per day    thiamine  250 mg IntraVENous Q24H    Followed by    [START ON 11/19/2024] thiamine  100 mg Oral Daily    multivitamin  1 tablet Oral Daily    folic acid  1 mg Oral Daily     PRN Meds: hydrOXYzine HCl, hydrALAZINE, perflutren lipid microspheres, sodium chloride flush, sodium chloride, potassium chloride **OR** potassium alternative oral replacement **OR** potassium chloride, magnesium sulfate, ondansetron **OR** ondansetron, polyethylene glycol, acetaminophen **OR** acetaminophen, sodium chloride flush, LORazepam **OR** LORazepam **OR** LORazepam **OR** LORazepam **OR** LORazepam **OR** LORazepam **OR** LORazepam **OR** LORazepam    Labs:     No results for input(s): \"WBC\", \"HGB\", \"HCT\", \"PLT\" in the last 72 hours.      Recent Labs     11/15/24  1425 11/15/24  1810 11/16/24  0719    136 141   K 4.0 3.9 4.3    102 105   CO2 23 20* 24   BUN 10 10 10   CREATININE 1.1 1.1 1.2   CALCIUM 9.1 9.2 9.5   PHOS  --   --  4.1       No results for input(s): \"ALKPHOS\", \"ALT\", \"AST\", \"BILITOT\", \"AMYLASE\", \"LIPASE\" in the last 72 hours.    Invalid input(s): \"PROT\", \"ALB\"      No results for input(s): \"INR\" in the last 72 hours.      No results for input(s): \"CKTOTAL\", \"TROPONINI\" in the last 72 hours.      Chronic labs:    Lab Results   Component Value Date    TSH 3.43 11/13/2024    INR 1.1 11/13/2024       Radiology: REVIEWED DAILY    +++++++++++++++++++++++++++++++++++++++++++++++++  Migue Mann MD  [facility]  [facility], [place]  +++++++++++++++++++++++++++++++++++++++++++++++++  NOTE: This report was

## 2024-11-16 NOTE — PROGRESS NOTES
Spoke to Jazlyn, sister of pt. Very concerned about recurrence of low sodium. Explained treatment. Pt stable.

## 2024-11-17 VITALS
HEIGHT: 70 IN | SYSTOLIC BLOOD PRESSURE: 104 MMHG | BODY MASS INDEX: 20.19 KG/M2 | OXYGEN SATURATION: 97 % | WEIGHT: 141 LBS | TEMPERATURE: 98.2 F | HEART RATE: 64 BPM | RESPIRATION RATE: 18 BRPM | DIASTOLIC BLOOD PRESSURE: 74 MMHG

## 2024-11-17 LAB
ANION GAP SERPL CALCULATED.3IONS-SCNC: 9 MMOL/L (ref 7–16)
BUN SERPL-MCNC: 16 MG/DL (ref 6–23)
CALCIUM SERPL-MCNC: 8.9 MG/DL (ref 8.6–10.2)
CHLORIDE SERPL-SCNC: 103 MMOL/L (ref 98–107)
CO2 SERPL-SCNC: 25 MMOL/L (ref 22–29)
CREAT SERPL-MCNC: 1.1 MG/DL (ref 0.7–1.2)
GFR, ESTIMATED: 72 ML/MIN/1.73M2
GLUCOSE SERPL-MCNC: 87 MG/DL (ref 74–99)
MAGNESIUM SERPL-MCNC: 2 MG/DL (ref 1.6–2.6)
MICROORGANISM SPEC CULT: NORMAL
MICROORGANISM SPEC CULT: NORMAL
PHOSPHATE SERPL-MCNC: 4.4 MG/DL (ref 2.5–4.5)
POTASSIUM SERPL-SCNC: 4.1 MMOL/L (ref 3.5–5)
SERVICE CMNT-IMP: NORMAL
SERVICE CMNT-IMP: NORMAL
SODIUM SERPL-SCNC: 137 MMOL/L (ref 132–146)
SPECIMEN DESCRIPTION: NORMAL
SPECIMEN DESCRIPTION: NORMAL

## 2024-11-17 PROCEDURE — 2580000003 HC RX 258: Performed by: INTERNAL MEDICINE

## 2024-11-17 PROCEDURE — 6360000002 HC RX W HCPCS: Performed by: INTERNAL MEDICINE

## 2024-11-17 PROCEDURE — 99232 SBSQ HOSP IP/OBS MODERATE 35: CPT | Performed by: INTERNAL MEDICINE

## 2024-11-17 PROCEDURE — 6370000000 HC RX 637 (ALT 250 FOR IP): Performed by: INTERNAL MEDICINE

## 2024-11-17 PROCEDURE — 84100 ASSAY OF PHOSPHORUS: CPT

## 2024-11-17 PROCEDURE — 2140000000 HC CCU INTERMEDIATE R&B

## 2024-11-17 PROCEDURE — 36415 COLL VENOUS BLD VENIPUNCTURE: CPT

## 2024-11-17 PROCEDURE — 80048 BASIC METABOLIC PNL TOTAL CA: CPT

## 2024-11-17 PROCEDURE — 83735 ASSAY OF MAGNESIUM: CPT

## 2024-11-17 PROCEDURE — 99232 SBSQ HOSP IP/OBS MODERATE 35: CPT | Performed by: NURSE PRACTITIONER

## 2024-11-17 RX ADMIN — ENOXAPARIN SODIUM 40 MG: 100 INJECTION SUBCUTANEOUS at 08:50

## 2024-11-17 RX ADMIN — SODIUM CHLORIDE, PRESERVATIVE FREE 10 ML: 5 INJECTION INTRAVENOUS at 20:19

## 2024-11-17 RX ADMIN — DIVALPROEX SODIUM 125 MG: 125 CAPSULE ORAL at 07:46

## 2024-11-17 RX ADMIN — ATORVASTATIN CALCIUM 40 MG: 40 TABLET, FILM COATED ORAL at 08:50

## 2024-11-17 RX ADMIN — METOPROLOL TARTRATE 25 MG: 25 TABLET, FILM COATED ORAL at 20:19

## 2024-11-17 RX ADMIN — LORAZEPAM 2 MG: 2 INJECTION INTRAMUSCULAR; INTRAVENOUS at 17:55

## 2024-11-17 RX ADMIN — HYDROXYZINE HYDROCHLORIDE 10 MG: 10 TABLET ORAL at 08:49

## 2024-11-17 RX ADMIN — SODIUM CHLORIDE, PRESERVATIVE FREE 10 ML: 5 INJECTION INTRAVENOUS at 12:05

## 2024-11-17 RX ADMIN — THIAMINE HYDROCHLORIDE 250 MG: 100 INJECTION, SOLUTION INTRAMUSCULAR; INTRAVENOUS at 20:19

## 2024-11-17 RX ADMIN — METOPROLOL TARTRATE 25 MG: 25 TABLET, FILM COATED ORAL at 08:50

## 2024-11-17 RX ADMIN — FOLIC ACID 1 MG: 1 TABLET ORAL at 08:49

## 2024-11-17 RX ADMIN — MULTIVITAMIN TABLET 1 TABLET: TABLET at 08:50

## 2024-11-17 RX ADMIN — LORAZEPAM 4 MG: 2 INJECTION INTRAMUSCULAR; INTRAVENOUS at 11:52

## 2024-11-17 RX ADMIN — DIVALPROEX SODIUM 125 MG: 125 CAPSULE ORAL at 17:55

## 2024-11-17 RX ADMIN — ESCITALOPRAM OXALATE 10 MG: 10 TABLET ORAL at 08:50

## 2024-11-17 NOTE — PLAN OF CARE
Problem: Discharge Planning  Goal: Discharge to home or other facility with appropriate resources  Outcome: Progressing  Flowsheets (Taken 11/16/2024 2045)  Discharge to home or other facility with appropriate resources: Identify barriers to discharge with patient and caregiver     Problem: Pain  Goal: Verbalizes/displays adequate comfort level or baseline comfort level  Outcome: Progressing     Problem: Safety - Adult  Goal: Free from fall injury  Outcome: Progressing     Problem: Skin/Tissue Integrity  Goal: Absence of new skin breakdown  Description: 1.  Monitor for areas of redness and/or skin breakdown  2.  Assess vascular access sites hourly  3.  Every 4-6 hours minimum:  Change oxygen saturation probe site  4.  Every 4-6 hours:  If on nasal continuous positive airway pressure, respiratory therapy assess nares and determine need for appliance change or resting period.  Outcome: Progressing     Problem: ABCDS Injury Assessment  Goal: Absence of physical injury  Outcome: Progressing     Problem: Confusion  Goal: Confusion, delirium, dementia, or psychosis is improved or at baseline  Description: INTERVENTIONS:  1. Assess for possible contributors to thought disturbance, including medications, impaired vision or hearing, underlying metabolic abnormalities, dehydration, psychiatric diagnoses, and notify attending LIP  2. [place] high risk fall precautions, as indicated  3. Provide frequent short contacts to provide reality reorientation, refocusing and direction  4. Decrease environmental stimuli, including noise as appropriate  5. Monitor and intervene to maintain adequate nutrition, hydration, elimination, sleep and activity  6. If unable to ensure safety without constant attention obtain sitter and review sitter guidelines with assigned personnel  7. Initiate Psychosocial CNS and Spiritual Care consult, as indicated  Outcome: Progressing  Flowsheets (Taken 11/16/2024 2045)  Effect of thought disturbance

## 2024-11-17 NOTE — PROGRESS NOTES
[facility]  Neurology follow up     Date:  11/17/2024  Patient Name:  Jason Fisher  YOB: 1960  MRN: [id number]     PCP:  Wes Lala MD   Referring:  No ref. provider found    Chief Complaint: AMS    History obtained from: chart review    History of Present Illness:  Jason Fisher is a 64 y.o. right handed male with past medical history of chronic alcoholism, chronic hyponatremia, seizure disorder, psychogenic polydipsia, anxiety, CAD status post CABG, hypertension, hyperlipidemia, dementia, COPD presenting for evaluation of AMS. Initally in the ED patient was agitated, did not follow commands. Reportedly brother called EMS after patient was found confused.  Brother reported no falls, head trauma, chest pain, nausea vomiting diarrhea. Reportedly patient excessively drinks water, and alcohol.  No seizure activity noted.  Patient had recent hospitalization in September 2024 for seizure activity with hyponatremia.    Initial workup showed hyponatremia, elevated lactic acid 4.6>3.1, leukocytosis, elevated troponins 48>95>145.  EKG no ST changes, CXR unremarkable, UDS and SDS negative     CT head showed no acute intracranial abnormality.  Unchanged chronic bifrontal encephalomalacia left greater than right.  Valproate level less than 3    On 11/13 pt agitated given midazolam 2 x2, droperidol 5mg, ketamine 55mg x2, ativan 2mg, 3mg x2 and was on a precedex drip;  Used to be on depakote 125mg BID.    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      At present time patient is alert and oriented x 3, confused to time.  There has been no reported seizure activity.  Patient does have a sitter at bedside     No family at bedside.  Vital signs have been stable    Allergies:      Allergies   Allergen Reactions    Haldol [Haloperidol] Other (See Comments)     Violent          Physical Examination  Vitals

## 2024-11-17 NOTE — PROGRESS NOTES
Attempted to d/c continuous sitter and replace with . Pt became irritable, getting out of bed. Asking same questions repeatedly, and become afraid requesting cigarettes, snacks, then refusing snacks her requested. He requires constatnt orientation and repeated reminders and becomes irritable quickly. Gave Ativan IV. Pt bed alarm on and vs taken. Requested charge nurse to send a sitter  for patient.

## 2024-11-17 NOTE — PROGRESS NOTES
[facility] Hospitalist Progress Note    SYNOPSIS: Patient admitted on 2024 for AMS (altered mental status)    Mr. Jason Fisher, a 64 y.o. year old male with past medical history of chronic alcoholism,  Chronic hyponatremia, seizure disorder, psychogenic polydipsia, anxiety, CAD s/p CABG, hypertension, hyperlipidemia, dementia, COPD who presented to the emergency department with altered mental status. Patient's brother reported that patient drinks excessive amounts of water and alcohol. Patient drinks 20 bottles of 16 ounces water a day  Patient had recent hospitalization in 2024 for seizure activity and hyponatremia. His labs were significant for sodium 121, lactic acid 4.6, CT head negative for acute finding, chest x-ray unremarkable, negative alcohol, placed on Precedex, received 1500 mg Keppra in the ED, admitted for further evaluation. Nephrology and neurology was consulted. Sodium began to improve with NS infusion. Troponin 48-->95-->145 on admission. EKG showed sinus tachycardia. Cardiology was consulted. There was concern for possible Wernicke's vs ETOH related dementia. Patietn was started on IV thiamine. ECHO was completed and largely unremarkable, no further cardiology workup needed. Hyponatremia resolved with fluids. TOM resolved with fluids. Patient was counseled on limiting fluid intake. MRI brain negative.     SUBJECTIVE:  Stable overnight. No other overnight issues reported.   Patient seen and examined  Records reviewed.     Temp (24hrs), Av.7 °F (36.5 °C), Min:97.1 °F (36.2 °C), Max:98.2 °F (36.8 °C)    DIET: ADULT DIET; Regular  CODE: Full Code    Intake/Output Summary (Last 24 hours) at 2024 1030  Last data filed at 2024 2259  Gross per 24 hour   Intake --   Output 350 ml   Net -350 ml         Review of Systems  All bolded are positive; please see HPI  General:  Fever, chills, diaphoresis, fatigue, malaise, night sweats, weight loss  Psychological:

## 2024-11-18 VITALS
RESPIRATION RATE: 18 BRPM | SYSTOLIC BLOOD PRESSURE: 133 MMHG | BODY MASS INDEX: 20.19 KG/M2 | WEIGHT: 141 LBS | OXYGEN SATURATION: 98 % | TEMPERATURE: 98 F | HEIGHT: 70 IN | DIASTOLIC BLOOD PRESSURE: 81 MMHG | HEART RATE: 76 BPM

## 2024-11-18 LAB
ALBUMIN SERPL-MCNC: 4 G/DL (ref 3.5–5.2)
ALP SERPL-CCNC: 72 U/L (ref 40–129)
ALT SERPL-CCNC: 21 U/L (ref 0–40)
ANION GAP SERPL CALCULATED.3IONS-SCNC: 9 MMOL/L (ref 7–16)
AST SERPL-CCNC: 21 U/L (ref 0–39)
BILIRUB SERPL-MCNC: 0.3 MG/DL (ref 0–1.2)
BUN SERPL-MCNC: 15 MG/DL (ref 6–23)
CALCIUM SERPL-MCNC: 9.3 MG/DL (ref 8.6–10.2)
CHLORIDE SERPL-SCNC: 101 MMOL/L (ref 98–107)
CO2 SERPL-SCNC: 25 MMOL/L (ref 22–29)
CREAT SERPL-MCNC: 1.3 MG/DL (ref 0.7–1.2)
ERYTHROCYTE [DISTWIDTH] IN BLOOD BY AUTOMATED COUNT: 13.2 % (ref 11.5–15)
GFR, ESTIMATED: 64 ML/MIN/1.73M2
GLUCOSE SERPL-MCNC: 90 MG/DL (ref 74–99)
HCT VFR BLD AUTO: 40.1 % (ref 37–54)
HGB BLD-MCNC: 13 G/DL (ref 12.5–16.5)
MAGNESIUM SERPL-MCNC: 2.1 MG/DL (ref 1.6–2.6)
MCH RBC QN AUTO: 29.9 PG (ref 26–35)
MCHC RBC AUTO-ENTMCNC: 32.4 G/DL (ref 32–34.5)
MCV RBC AUTO: 92.2 FL (ref 80–99.9)
PHOSPHATE SERPL-MCNC: 3.5 MG/DL (ref 2.5–4.5)
PLATELET # BLD AUTO: 255 K/UL (ref 130–450)
PMV BLD AUTO: 9.8 FL (ref 7–12)
POTASSIUM SERPL-SCNC: 4.1 MMOL/L (ref 3.5–5)
PROT SERPL-MCNC: 6.6 G/DL (ref 6.4–8.3)
RBC # BLD AUTO: 4.35 M/UL (ref 3.8–5.8)
SODIUM SERPL-SCNC: 135 MMOL/L (ref 132–146)
WBC OTHER # BLD: 7.5 K/UL (ref 4.5–11.5)

## 2024-11-18 PROCEDURE — 84100 ASSAY OF PHOSPHORUS: CPT

## 2024-11-18 PROCEDURE — 2580000003 HC RX 258: Performed by: INTERNAL MEDICINE

## 2024-11-18 PROCEDURE — 99239 HOSP IP/OBS DSCHRG MGMT >30: CPT | Performed by: INTERNAL MEDICINE

## 2024-11-18 PROCEDURE — 85027 COMPLETE CBC AUTOMATED: CPT

## 2024-11-18 PROCEDURE — 6360000002 HC RX W HCPCS: Performed by: INTERNAL MEDICINE

## 2024-11-18 PROCEDURE — 6370000000 HC RX 637 (ALT 250 FOR IP): Performed by: INTERNAL MEDICINE

## 2024-11-18 PROCEDURE — 36415 COLL VENOUS BLD VENIPUNCTURE: CPT

## 2024-11-18 PROCEDURE — 80053 COMPREHEN METABOLIC PANEL: CPT

## 2024-11-18 PROCEDURE — 83735 ASSAY OF MAGNESIUM: CPT

## 2024-11-18 PROCEDURE — 99232 SBSQ HOSP IP/OBS MODERATE 35: CPT | Performed by: INTERNAL MEDICINE

## 2024-11-18 RX ORDER — LANOLIN ALCOHOL/MO/W.PET/CERES
100 CREAM (GRAM) TOPICAL DAILY
Qty: 30 TABLET | Refills: 0 | Status: SHIPPED | OUTPATIENT
Start: 2024-11-19

## 2024-11-18 RX ADMIN — ATORVASTATIN CALCIUM 40 MG: 40 TABLET, FILM COATED ORAL at 08:18

## 2024-11-18 RX ADMIN — MULTIVITAMIN TABLET 1 TABLET: TABLET at 08:16

## 2024-11-18 RX ADMIN — METOPROLOL TARTRATE 25 MG: 25 TABLET, FILM COATED ORAL at 08:18

## 2024-11-18 RX ADMIN — ENOXAPARIN SODIUM 40 MG: 100 INJECTION SUBCUTANEOUS at 08:17

## 2024-11-18 RX ADMIN — DIVALPROEX SODIUM 125 MG: 125 CAPSULE ORAL at 08:18

## 2024-11-18 RX ADMIN — SODIUM CHLORIDE, PRESERVATIVE FREE 10 ML: 5 INJECTION INTRAVENOUS at 08:18

## 2024-11-18 RX ADMIN — FOLIC ACID 1 MG: 1 TABLET ORAL at 08:18

## 2024-11-18 RX ADMIN — ESCITALOPRAM OXALATE 10 MG: 10 TABLET ORAL at 08:18

## 2024-11-18 NOTE — PLAN OF CARE
Problem: Discharge Planning  Goal: Discharge to home or other facility with appropriate resources  Outcome: Progressing     Problem: Pain  Goal: Verbalizes/displays adequate comfort level or baseline comfort level  Outcome: Progressing     Problem: Safety - Adult  Goal: Free from fall injury  Outcome: Progressing     Problem: Skin/Tissue Integrity  Goal: Absence of new skin breakdown  Description: 1.  Monitor for areas of redness and/or skin breakdown  2.  Assess vascular access sites hourly  3.  Every 4-6 hours minimum:  Change oxygen saturation probe site  4.  Every 4-6 hours:  If on nasal continuous positive airway pressure, respiratory therapy assess nares and determine need for appliance change or resting period.  Outcome: Progressing     Problem: ABCDS Injury Assessment  Goal: Absence of physical injury  Outcome: Progressing     Problem: Confusion  Goal: Confusion, delirium, dementia, or psychosis is improved or at baseline  Description: INTERVENTIONS:  1. Assess for possible contributors to thought disturbance, including medications, impaired vision or hearing, underlying metabolic abnormalities, dehydration, psychiatric diagnoses, and notify attending LIP  2. [place] high risk fall precautions, as indicated  3. Provide frequent short contacts to provide reality reorientation, refocusing and direction  4. Decrease environmental stimuli, including noise as appropriate  5. Monitor and intervene to maintain adequate nutrition, hydration, elimination, sleep and activity  6. If unable to ensure safety without constant attention obtain sitter and review sitter guidelines with assigned personnel  7. Initiate Psychosocial CNS and Spiritual Care consult, as indicated  Outcome: Progressing

## 2024-11-18 NOTE — PROGRESS NOTES
[facility]  MD Jorge Garay, MD Mely Sesay, CNP   Pushpa Gutierrez, KAITLYNN Shah, KALIN  Progress Note    11/18/2024    SUBJECTIVE:   11/14: Sitting up in bed, no acute distress. He is eating his lunch. He is alert to self and place. Vital signs are stable. Per his brother whom is present at the bedside, the patient drinks very large quantities of water daily.     11/15: Sitting up in bed, somewhat confused. Sitter present at bedside.  MRI ordered. He is having some nausea today. Denies dyspnea, chest pain, or palpitations. Vital signs are stable.     11/16: Resting comfortably watching television.  Breathing comfortably on room air.  Asking for more water.  Sitter at bedside notes no new issues.    11/18: Sitting up in bed, no acute distress. Feels \"anxious.\" Otherwise, no acute complaints. He denies dyspnea, chest pain, or palpitations. Asking for water.     PROBLEM LIST:    Principal Problem:    AMS (altered mental status)  Active Problems:    Hyponatremia    Elevated troponin    Acute metabolic encephalopathy  Resolved Problems:    * No resolved hospital problems. *         DIET:    ADULT DIET; Regular     MEDS (scheduled):    escitalopram  10 mg Oral Daily    divalproex  125 mg Oral BID    nicotine  1 patch TransDERmal Daily    metoprolol tartrate  25 mg Oral BID    atorvastatin  40 mg Oral Daily    sodium chloride flush  5-40 mL IntraVENous 2 times per day    enoxaparin  40 mg SubCUTAneous Daily    sodium chloride flush  5-40 mL IntraVENous 2 times per day    thiamine  250 mg IntraVENous Q24H    Followed by    [START ON 11/19/2024] thiamine  100 mg Oral Daily    multivitamin  1 tablet Oral Daily    folic acid  1 mg Oral Daily       MEDS (infusions):   sodium chloride         MEDS (prn):  hydrOXYzine HCl, hydrALAZINE, perflutren lipid microspheres, sodium chloride flush, sodium chloride, potassium chloride **OR** potassium alternative oral

## 2024-11-18 NOTE — DISCHARGE SUMMARY
negative. Patient was counseled on limiting water intake and alcohol cessation. He was discharged home in stable condition.       Exam:     /81   Pulse 76   Temp 98 °F (36.7 °C) (Oral)   Resp 18   Ht 1.778 m (5' 10\")   Wt 64 kg (141 lb)   SpO2 98%   BMI 20.23 kg/m²     General appearance: No apparent distress, appears stated age and cooperative.  HEENT: Pupils equal, round, and reactive to light. Conjunctivae/corneas clear.  Neck: Supple, with full range of motion. No jugular venous distention. Trachea midline.  Respiratory:  Normal respiratory effort. Clear to auscultation, bilaterally without Rales/Wheezes/Rhonchi.  Cardiovascular: Regular rate and rhythm with normal S1/S2 without murmurs, rubs or gallops.  Abdomen: Soft, non-tender, non-distended with normal bowel sounds.  Musculoskeletal: No clubbing, cyanosis or edema bilaterally.  Full range of motion without deformity.  Skin: Skin color, texture, turgor normal.  No rashes or lesions.  Neurologic:  Neurovascularly intact without any focal sensory/motor deficits. Cranial nerves: II-XII intact, grossly non-focal.  Psychiatric: Alert and oriented, thought content appropriate, normal insight      Consults:     IP CONSULT TO HOSPITALIST  IP CONSULT TO NEPHROLOGY  IP CONSULT TO CRITICAL CARE  IP CONSULT TO NEPHROLOGY  IP CONSULT TO SOCIAL WORK  IP CONSULT TO CARDIOLOGY  IP CONSULT TO NEPHROLOGY    Significant Diagnostic Studies:     MRI BRAIN WO CONTRAST    Result Date: 11/18/2024  EXAMINATION: MRI OF THE BRAIN WITHOUT CONTRAST  11/16/2024 8:27 am TECHNIQUE: Multiplanar multisequence MRI of the brain was performed without the administration of intravenous contrast. COMPARISON: CT brain 11/12/2024 HISTORY: ORDERING SYSTEM PROVIDED HISTORY: AMS TECHNOLOGIST PROVIDED HISTORY: Reason for exam:->AMS FINDINGS: INTRACRANIAL STRUCTURES/VENTRICLES: There are no areas of restricted diffusion identified to suggest an acute infarct.  There is no acute intracranial

## 2024-11-18 NOTE — PROGRESS NOTES
Patiens sister who is on the chart had questions for attending. Attending made aware via perfect serve.

## 2024-11-18 NOTE — CARE COORDINATION
Patient on room air, sitter at bedside; plan to remove today. Patient pending MRI of the brain and PT/OT evals; neurology, cardiology and nephrology following. Call made to Olga schwartz [facility] to check if they can accept; awaiting PT/OT evals and need to complete an asset check. Ambulance form completed; PASRR and MICK will need done.     Electronically signed by TERRANCE Lagunas on 11/15/2024 at 11:40 AM   
Patient remains on room air, lactic 3.1 yesterday, restraints removed today, echo completed yesterday, EF 60% and PT/OT evals pending; nephrology and cardiology following. Spoke with patient's brother, Stuart at bedside, discussed [facility] choices; Stuart would like a referral made to [facility], states he does not know much about the facilities and agreeable to any facility that can accept, if [facility] cannot; prefers [facility]. Referral made to Olga at [facility]; awaiting evals.    The Plan for Transition of Care is related to the following treatment goals: discharge planning    The Patient and/or patient representative Jason Albert was provided with a choice of provider and agrees with the discharge plan. [x] Yes [] No    Freedom of choice list was provided with basic dialogue that supports the patient's individualized plan of care/goals, treatment preferences and shares the quality data associated with the providers. [x] Yes [] No     Electronically signed by TERRANCE Lagunas on 11/14/2024 at 3:19 PM   
Patient remains with sitter at bedside, was evaluated by PT/OT on Friday, PT [facility] 16/24; ambulated 180ft without a device. Call made to Olga at [facility] to check if they are able to accept; she states patient's home was sold this year and will not qualify for medicaid. Spoke with patient's sister, Jazlyn at bedside. Call made to patient's brother, Fletcher, provided update regarding PT score, patient not being appropriate for [facility] unless he is willing to private pay and facility unable to accept. Fletcher expressed his frustration with the patient's multiple admissions due to him drinking too much water, requesting a psych consult. Advised patient not appropriate for psych due to drinking too much water. Discussed a referral to [facility]; he is interested and will be in before 2:30P for the patient. Perfect serve message sent to attending for discharge order.    Electronically signed by TERRANCE Lagunas on 11/18/2024 at 12:58 PM   
individualized plan of care/goals and shares the quality data associated with the providers was provided to: Patient Representative   Patient Representative Name: brother, Stuart     The Patient and/or Patient Representative Agree with the Discharge Plan? Yes    Electronically signed by TERRANCE Lagunas on 11/13/2024 at 3:04 PM

## 2024-11-18 NOTE — PROGRESS NOTES
[facility] Hospitalist Progress Note    SYNOPSIS: Patient admitted on 2024 for AMS (altered mental status)    Mr. Jason Fisher, a 64 y.o. year old male with past medical history of chronic alcoholism,  Chronic hyponatremia, seizure disorder, psychogenic polydipsia, anxiety, CAD s/p CABG, hypertension, hyperlipidemia, dementia, COPD who presented to the emergency department with altered mental status. Patient's brother reported that patient drinks excessive amounts of water and alcohol. Patient drinks 20 bottles of 16 ounces water a day  Patient had recent hospitalization in 2024 for seizure activity and hyponatremia. His labs were significant for sodium 121, lactic acid 4.6, CT head negative for acute finding, chest x-ray unremarkable, negative alcohol, placed on Precedex, received 1500 mg Keppra in the ED, admitted for further evaluation. Nephrology and neurology was consulted. Sodium began to improve with NS infusion. Troponin 48-->95-->145 on admission. EKG showed sinus tachycardia. Cardiology was consulted. There was concern for possible Wernicke's vs ETOH related dementia. Patietn was started on IV thiamine. ECHO was completed and largely unremarkable, no further cardiology workup needed. Hyponatremia resolved with fluids. TOM resolved with fluids. Patient was counseled on limiting fluid intake. MRI brain negative.     SUBJECTIVE:  Stable overnight. No other overnight issues reported.   Patient seen and examined  Records reviewed.     Temp (24hrs), Av °F (36.7 °C), Min:97.4 °F (36.3 °C), Max:98.2 °F (36.8 °C)    DIET: ADULT DIET; Regular  CODE: Full Code    Intake/Output Summary (Last 24 hours) at 2024 1001  Last data filed at 2024 1816  Gross per 24 hour   Intake 500 ml   Output 400 ml   Net 100 ml         Review of Systems  All bolded are positive; please see HPI  General:  Fever, chills, diaphoresis, fatigue, malaise, night sweats, weight loss  Psychological:

## 2024-11-20 ENCOUNTER — PATIENT OUTREACH (OUTPATIENT)
Dept: PRIMARY CARE | Facility: CLINIC | Age: 64
End: 2024-11-20
Payer: MEDICARE

## 2024-11-20 NOTE — PROGRESS NOTES
Discharge Facility: [facility]  Discharge Diagnosis: Hyponatremia (Primary Dx);   Psychogenic polydipsia;   Elevated lactic acid level;   Elevated troponin;   Acute coronary syndrome (HCC)   Admission Date: 11/12/24  Discharge Date: 11/18/24    PCP Appointment Date: no appointment, message to office  Specialist Appointment Date: n/a  Hospital Encounter and Summary Linked: Yes    Two attempts were made to reach patient within two business days after discharge. Voicemail left with contact information for patient to call back with any non-emergent questions or concerns if able.

## 2024-11-20 NOTE — PROGRESS NOTES
Physician Progress Note      PATIENT:               SHELBY BRODY  CSN #:                  [id number]  :                       1960  ADMIT DATE:       2024 5:51 PM  DISCH DATE:        2024 2:43 PM  RESPONDING  PROVIDER #:        Migue Mann MD          QUERY TEXT:    Pt admitted with AMS with chronic Alcohol Use and has encephalopathy   documented. If possible, please document in progress notes and discharge   summary further specificity regarding the type of encephalopathy:    The medical record reflects the following:  Risk Factors: ETOH Abuse, HTN, Seizures  Clinical Indicators: Presents for AMS chronic ETOH use, seizure disorder, CAD,   s/p CABG, HTN  NA: 121, Lactic 4.6, received KEPPRA in the ED    WBC: 18.2, suspected due to seizure per H&P.  Acute encephalopathy with severe   hyponatremia is noted.  LOW NA in the setting of decreased circulating volume   per notes on  with TOM due to same.   CKD 3 noted.  AMS: concern for   hyponatremia vx. Wernicke's vs. ETOH related dementia is improving. per notes   on 11/15/24.  NA; 122, 125, 138  HTN urgency is also noted.  Notes now state pt. is becoming more alert but   confused.    Treatment: NS Fluids, D5W fluids, serial labs, Blood Cultures. MRI Brain.   keppra IV  Options provided:  -- Alcoholic encephalopathy  -- Hypertensive encephalopathy  -- Metabolic encephalopathy  -- Wernicke?s encephalopathy  -- Other - I will add my own diagnosis  -- Disagree - Not applicable / Not valid  -- Disagree - Clinically unable to determine / Unknown  -- Refer to Clinical Documentation Reviewer    PROVIDER RESPONSE TEXT:    This patient has metabolic encephalopathy.    Query created by: Chiquis Ferrell on 11/15/2024 4:16 PM      Electronically signed by:  Migue Mann MD 2024 1:33 PM

## 2024-12-03 DIAGNOSIS — I10 PRIMARY HYPERTENSION: ICD-10-CM

## 2024-12-03 DIAGNOSIS — E78.1 PURE HYPERTRIGLYCERIDEMIA: ICD-10-CM

## 2024-12-03 DIAGNOSIS — I25.10 CORONARY ARTERY STENOSIS: ICD-10-CM

## 2024-12-03 RX ORDER — METOPROLOL TARTRATE 25 MG/1
25 TABLET, FILM COATED ORAL 2 TIMES DAILY
Qty: 180 TABLET | Refills: 0 | Status: SHIPPED | OUTPATIENT
Start: 2024-12-03

## 2024-12-03 RX ORDER — ATORVASTATIN CALCIUM 40 MG/1
60 TABLET, FILM COATED ORAL
Qty: 90 TABLET | Refills: 0 | Status: SHIPPED | OUTPATIENT
Start: 2024-12-03

## 2024-12-13 PROBLEM — R79.89 ELEVATED TROPONIN: Status: RESOLVED | Noted: 2024-11-13 | Resolved: 2024-12-13

## 2025-01-06 ENCOUNTER — APPOINTMENT (OUTPATIENT)
Dept: PRIMARY CARE | Facility: CLINIC | Age: 65
End: 2025-01-06
Payer: MEDICARE

## 2025-04-28 ENCOUNTER — OFFICE VISIT (OUTPATIENT)
Dept: NEUROLOGY | Facility: HOSPITAL | Age: 65
End: 2025-04-28
Payer: MEDICARE

## 2025-04-28 VITALS
HEART RATE: 66 BPM | WEIGHT: 162.7 LBS | DIASTOLIC BLOOD PRESSURE: 89 MMHG | BODY MASS INDEX: 23.35 KG/M2 | SYSTOLIC BLOOD PRESSURE: 143 MMHG

## 2025-04-28 DIAGNOSIS — F41.1 GENERALIZED ANXIETY DISORDER: ICD-10-CM

## 2025-04-28 DIAGNOSIS — F10.27 DEMENTIA ASSOCIATED WITH ALCOHOLISM WITH BEHAVIORAL DISTURBANCE: ICD-10-CM

## 2025-04-28 DIAGNOSIS — E87.1 HYPONATREMIA: ICD-10-CM

## 2025-04-28 DIAGNOSIS — F54 PSYCHOGENIC POLYDIPSIA: ICD-10-CM

## 2025-04-28 DIAGNOSIS — R56.9 SEIZURE (MULTI): Primary | ICD-10-CM

## 2025-04-28 DIAGNOSIS — R63.1 PSYCHOGENIC POLYDIPSIA: ICD-10-CM

## 2025-04-28 PROCEDURE — 1123F ACP DISCUSS/DSCN MKR DOCD: CPT | Performed by: PSYCHIATRY & NEUROLOGY

## 2025-04-28 PROCEDURE — 1160F RVW MEDS BY RX/DR IN RCRD: CPT | Performed by: PSYCHIATRY & NEUROLOGY

## 2025-04-28 PROCEDURE — G2211 COMPLEX E/M VISIT ADD ON: HCPCS | Performed by: PSYCHIATRY & NEUROLOGY

## 2025-04-28 PROCEDURE — 99215 OFFICE O/P EST HI 40 MIN: CPT | Performed by: PSYCHIATRY & NEUROLOGY

## 2025-04-28 PROCEDURE — 3077F SYST BP >= 140 MM HG: CPT | Performed by: PSYCHIATRY & NEUROLOGY

## 2025-04-28 PROCEDURE — 1159F MED LIST DOCD IN RCRD: CPT | Performed by: PSYCHIATRY & NEUROLOGY

## 2025-04-28 PROCEDURE — 3079F DIAST BP 80-89 MM HG: CPT | Performed by: PSYCHIATRY & NEUROLOGY

## 2025-04-28 PROCEDURE — 1126F AMNT PAIN NOTED NONE PRSNT: CPT | Performed by: PSYCHIATRY & NEUROLOGY

## 2025-04-28 RX ORDER — ESCITALOPRAM OXALATE 10 MG/1
10 TABLET ORAL
COMMUNITY

## 2025-04-28 RX ORDER — CLONAZEPAM 0.5 MG/1
0.5 TABLET ORAL 2 TIMES DAILY PRN
COMMUNITY

## 2025-04-28 RX ORDER — MEMANTINE HYDROCHLORIDE 5 MG/1
5 TABLET ORAL DAILY
Qty: 30 TABLET | Refills: 0 | Status: SHIPPED | OUTPATIENT
Start: 2025-04-28 | End: 2025-05-28

## 2025-04-28 ASSESSMENT — ENCOUNTER SYMPTOMS
DEPRESSION: 0
OCCASIONAL FEELINGS OF UNSTEADINESS: 0
LOSS OF SENSATION IN FEET: 0

## 2025-04-28 ASSESSMENT — PAIN SCALES - GENERAL: PAINLEVEL_OUTOF10: 0-NO PAIN

## 2025-04-28 NOTE — PROGRESS NOTES
"In-clinic visit    Visit type: new patient visit    PCP: Matthieu Winn MD.      Subjective     Saturnino oCoper is a 64 y.o. year old right handed male who presents with Seizures and Dementia (Former neurologist 3 years ago in [place]. Not seen by anyone since.  ).    Patient is accompanied by brother/brother's girlfriend.     HPI    Came late seen anyway. No SLUMS done due to no time.    Per brother, referring PCP Dr Hicks \"fired\" patient as he wasn't following thru with neurology recommendations. Brother is bringing him back in today so this doesn't happen again, and since he is responsible for patient's welfare.    Pt unable to remember and give much history. Brother (who lives with pt) gives most of history.    Would drink too much water. Has had 4x sz, all in setting of hyponatremia. Has had more times of hyponatremia. Sz only when sodium is low. First time sz 2023. Lowest sodium was 118 per brother. Most if not all of his hospitalizations were in [place]. I don't have recent brain images, just report.    Never had sz when younger.    2021 MRI brain showed bifrontal encephalomalacia, suggestion of possible prior TBI. Brother today states he had episode of hitting his head in 2014.    Also here wanting to know what's going on with his \"dementia\". On review of records, he carries a diagnosis of dementia possibly due to vascular as well as alcohol. He was previously evaluated and seen in the memory clinic, it appears last was in 2022.  When last seen by memory clinic 6/2022. Dementia poss due to vascular disease and prior alcoholism. Trialed on rivastigmine patches. Trauma? From falls when drunk. Formal driving evaluation recommended.    Over the years, memory has not been worse, intermittently some days better.    Seeing psychiatrist in [place], \"Steh COLLINS\" (unsure of name) in [facility].    Not driving. He is able to make food (simple sandwich), but brother does most of the things for him. " Sometimes he gets mixed up. Not burning things. He is able to take a bath by himself or changes himself.    Per brother, psychiatrist took him off depakote. Was on keppra, psych stopped it as well.    He apparently drank heavy alcohol in the past, stopped for about 20 years, and relapsed per brother Giorgi.    S/p donepezil--upset stomach  Rivastigmine patch--unclear if he ever took that or what happened  Has not tried memantine    Daily smoker still. No alcohol, quit 3 years ago now. No street drug use.      Review of Systems   Constitutional:  Negative for appetite change, chills and fever.   HENT:  Negative for ear pain and nosebleeds.    Eyes:  Negative for discharge and itching.   Respiratory:  Negative for choking and chest tightness.    Cardiovascular:  Negative for chest pain and palpitations.   Gastrointestinal:  Negative for abdominal distention, abdominal pain and nausea.   Endocrine: Negative for cold intolerance and heat intolerance.   Genitourinary:  Negative for difficulty urinating and dysuria.   Musculoskeletal:  Negative for gait problem and myalgias.   Neurological:  Negative for dizziness, seizures and numbness.     Problem List[1]    Medical History[2]  Surgical History[3]    Social History     Tobacco Use    Smoking status: Every Day     Current packs/day: 1.00     Average packs/day: 1 pack/day for 40.0 years (40.0 ttl pk-yrs)     Types: Cigarettes    Smokeless tobacco: Never   Substance Use Topics    Alcohol use: Not Currently     family history includes Heart attack in his brother and father; Lung cancer in his mother.    Allergies[4]  Current Medications[5]    Objective     /89   Pulse 66   Wt 73.8 kg (162 lb 11.2 oz)   BMI 23.35 kg/m²     Awake, alert, oriented, in no distress  Appears forgetful  Well-nourished, ambulatory independently  No leg edema    Mental status exam as above, conversant   Pupils round reactive to light, 3-4 mm, (-) RAPD   Fundoscopic examination was attempted  but fundus was not visualized bilaterally   Full EOMs intact, no nystagmus, no ptosis   V1 to V3 sensation is intact   No facial droop   Hearing grossly intact   No dysarthria  Good shoulder shrug bilaterally   Tongue is midline     Motor strength is 5/5 on all extremities, tone/bulk normal   Reflexes 1+ on BUE, 1+ on B knees, absent on B ankles, downgoing toes bilaterally   Sensation is intact to light touch, vibration on all 4 extremities except dec vib B big toes  Finger to nose test intact bilaterally   Negative Romberg sign   Normal gait       Lab Results   Component Value Date    WBC 7.5 04/03/2023    RBC 4.81 04/03/2023    HGB 14.2 04/03/2023    HCT 45.2 04/03/2023     04/03/2023     04/03/2023    K 5.1 04/03/2023     04/03/2023    BUN 15 04/03/2023    CREATININE 1.29 04/03/2023    CALCIUM 10.4 04/03/2023    ALKPHOS 72 04/03/2023    AST 30 04/03/2023    ALT 30 04/03/2023    ZMCUUJYF88 490 12/20/2021    HGBA1C 5.4 12/29/2022    LDLCALC 104 (H) 01/29/2024    CHOL 172 01/29/2024    HDL 58.3 01/29/2024    TRIG 49 01/29/2024    TSH 2.17 12/29/2022        MR brain wo IV contrast 11/16/2024    Narrative  EXAMINATION:  MRI OF THE BRAIN WITHOUT CONTRAST  11/16/2024 8:27 am    TECHNIQUE:  Multiplanar multisequence MRI of the brain was performed without the  administration of intravenous contrast.    COMPARISON:  CT brain 11/12/2024    HISTORY:  ORDERING SYSTEM PROVIDED HISTORY: AMS  TECHNOLOGIST PROVIDED HISTORY:  Reason for exam:->AMS    FINDINGS:  INTRACRANIAL STRUCTURES/VENTRICLES: There are no areas of restricted  diffusion identified to suggest an acute infarct.  There is no acute  intracranial hemorrhage.  No mass effect or midline shift is present.  Encephalomalacia within the anterior inferior frontal lobes is present.  There is hemosiderin deposition suggesting the sequelae of a previous  traumatic injury.    Multiple foci of abnormal increased T2/FLAIR signal intensity are present  within  the periventricular, subcortical and deep white matter of both  hemispheres.  Similar signal abnormality is present within the josette.  There  is no sellar or suprasellar mass present.  There is no ventriculomegaly or  abnormal extra-axial fluid collection present.  The proximal portions of the  [place] demonstrate normal flow voids.    ORBITS: Limited evaluation of the orbits is unremarkable.    SINUSES: The paranasal sinuses and mastoid air cells are clear.    BONES/SOFT TISSUES: Bone marrow signal intensity is normal.    Impression  1. No acute intracranial process identified.  2. Posttraumatic encephalomalacia within the frontal lobes.  3. Mild chronic small vessel ischemic changes.      Assessment/Plan     Dementia, by history, with behavioral disturbance  Bifrontal encephalomalacia on head imaging--? Post-traumatic  Hx alcohol abuse  Tobacco use disorder  Discussed with brother, I will not be reinventing the wheel here. He had been seen by memory clinic in the past and carried this diagnosis. Alcohol likely has a role in this.  Brother asks if he had Wernicke encephalopathy. Discussed, that is not something I can ascertain at this time that may have happened in the past, but given clinical situation in the past it is a POSSIBILITY--no way to ascertain  In addition to dementia symptoms, it appears he has some impulsive behavior, which MAY be related to, or due to, bifrontal encephalomalacia. I advised brother to discuss this with his psychiatry practitioner as he may benefit from being medicated for this. This is beyond the scope of my practice.  S/p donepezil  ? Trial of rivastigmine patch in past--? What happened, ? If tried  Has not been on memantine per brother  Discussed poss trial of memantine, poss SE discussed, realistic expectations discussed--pt brother wants him to try    5. Seizure, symptomatic  6. Hyponatremia  7. Psychogenic polydipsia  #5 likely due to #6, likely due to #7, in setting of  chronic bifrontal ?? Traumatic (vs non-traumatic) encephalomalacia (which probably is a predisposition for possible sz for him)  Discussed, symptomatic seizure related to hyponatremia is not treated chronically with anti-seizure medication  Discussed, seizure related to alcohol use, or poss alcohol withdrawal, is not treated chronically with anti-seizure medication  I advised brother to discuss his psychogenic polydipsia with his psychiatry practitioner  Will do EEG--no anti-seizure medication for now    Plans:  Get last brain imaging images from [place] over for review--[facility], 11/2024  2.   Do EEG  3.   If you let me know name of his psychiatry practitioner I can send over my clinic note to his office if you would like  4.   Try memantine 5mg daily--report to my office in 3 weeks see how you're doing, if no SE will plan to increase to 5mg bid  5.   Discuss with your psychiatry practitioner about your impulsiveness and psychogenic polydipsia  6.   Pt not driving  7.   Discussed, pt cannot be on smoking cessation pills (ie bupropion, varenicline, etc) due to poss SE of sz    Rtc after 2 mo    All questions were answered.  Pt knows how to contact my office in case pt has any questions or concerns.    Bj Mancia MD              [1]   Patient Active Problem List  Diagnosis    Abnormal thyroid function test    Anemia    Anxiety    Cigarette smoker    Coronary artery stenosis    Elevated WBC count    Elevated liver enzymes    Edema    Diastolic dysfunction    Dementia associated with alcoholism with behavioral disturbance    Hyperlipidemia    Hypertension    Hyponatremia    IFG (impaired fasting glucose)    Night muscle spasms    Other microscopic hematuria    Rapid weight loss    Recurrent major depressive disorder, in partial remission (CMS-HCC)    Vitamin D deficiency    Difficulty in walking, not elsewhere classified    H/O ETOH abuse    Muscle weakness    Other symbolic dysfunctions    Pure hypercholesterolemia     S/P CABG (coronary artery bypass graft)    Urinary frequency    Psychogenic polydipsia    Routine general medical examination at a health care facility    Encounter for screening for malignant neoplasm of colon    Screening for prostate cancer    Great toe pain, left    Centrilobular emphysema (Multi)    Pulmonary nodules    Seizure (Multi)    Generalized anxiety disorder   [2]   Past Medical History:  Diagnosis Date    Abnormal LFTs 01/25/2023    Alcohol dependency (Multi) 01/25/2023    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) 01/25/2023    Personal history of other diseases of the circulatory system 06/27/2019    History of coronary artery disease    Personal history of other endocrine, nutritional and metabolic disease 04/18/2019    History of hyperlipidemia    Pneumothorax, iatrogenic 01/25/2023    Stage 3a chronic kidney disease (Multi) 01/25/2023    Unspecified dementia, unspecified severity, without behavioral disturbance, psychotic disturbance, mood disturbance, and anxiety 07/15/2021   [3]   Past Surgical History:  Procedure Laterality Date    CORONARY ARTERY BYPASS GRAFT  03/28/2014    Coronary Artery Surgery    CORONARY ARTERY BYPASS GRAFT  09/08/2014    CABG    SHOULDER SURGERY  03/28/2014    Shoulder Surgery    VASECTOMY  03/28/2014    Surgery Vas Deferens Vasectomy   [4]   Allergies  Allergen Reactions    Haldol [Haloperidol] Other     Becomes violent   [5]   Current Outpatient Medications:     atorvastatin (Lipitor) 40 mg tablet, Take 1.5 tablets (60 mg) by mouth once every day., Disp: 90 tablet, Rfl: 0    clonazePAM (KlonoPIN) 0.5 mg tablet, Take 1  tablet (0.5 mg) by mouth 2 times a day as needed for anxiety., Disp: , Rfl:     escitalopram (Lexapro) 10 mg tablet, 1 tablet (10 mg)., Disp: , Rfl:     metoprolol tartrate (Lopressor) 25 mg tablet, Take 1 tablet (25 mg) by mouth 2 times a day., Disp: 180 tablet, Rfl: 0    thiamine (Vitamin B-1) 50 mg tablet, Take 1 tablet (50 mg) by mouth once daily., Disp: , Rfl:     divalproex sprinkle (Depakote Sprinkle) 125 mg DR capsule, Take 1 capsule (125 mg) by mouth 2 times a day. (Patient not taking: Reported on 4/28/2025), Disp: , Rfl:     melatonin 5 mg tablet,chewable, Chew. (Patient not taking: Reported on 4/28/2025), Disp: , Rfl:     memantine (Namenda) 5 mg tablet, Take 1 tablet (5 mg) by mouth once daily., Disp: 30 tablet, Rfl: 0    nicotine (Nicoderm CQ) 21 mg/24 hr patch, Place 1 patch over 24 hours on the skin once every 24 hours., Disp: 60 patch, Rfl: 0    Current Facility-Administered Medications:     ciclopirox (Penlac) 8 % solution, , Topical, Once, Luis Downey DPM

## 2025-04-28 NOTE — PATIENT INSTRUCTIONS
Get last brain imaging images from [place] over for review--[facility], 11/2024--staff to help get this  2.   Do EEG  3.   If you let me know name of his psychiatry practitioner I can send over my clinic note to his office if you would like  4.   Try memantine 5mg daily--report to my office in 3 weeks see how you're doing, if no SE will plan to increase to 5mg bid  5.   Discuss with your psychiatry practitioner about your impulsiveness and psychogenic polydipsia  6.   Pt not driving  7.   Discussed, pt cannot be on smoking cessation pills (ie bupropion, varenicline, etc) due to poss SE of sz    Rtc after 2 mo

## 2025-04-28 NOTE — LETTER
"April 28, 2025     Chente Hicks MD  [address], [address]  [facility] [address]    Patient: Saturnino Cooper   YOB: 1960   Date of Visit: 4/28/2025       Dear Dr. Chente Hicks MD:    Thank you for referring Saturnino Cooper to me for evaluation. Below are my notes for this consultation.  If you have questions, please do not hesitate to call me. I look forward to following your patient along with you.       Sincerely,     Bj Mancia MD      CC: Matthieu Winn MD  ______________________________________________________________________________________    In-clinic visit    Visit type: new patient visit    PCP: Matthieu Winn MD.      Subjective    Saturnino Cooper is a 64 y.o. year old right handed male who presents with Seizures and Dementia (Former neurologist 3 years ago in [place]. Not seen by anyone since.  ).    Patient is accompanied by brother/brother's girlfriend.     HPI    Came late seen anyway. No SLUMS done due to no time.    Per brother, referring PCP Dr Hicks \"fired\" patient as he wasn't following thru with neurology recommendations. Brother is bringing him back in today so this doesn't happen again, and since he is responsible for patient's welfare.    Pt unable to remember and give much history. Brother (who lives with pt) gives most of history.    Would drink too much water. Has had 4x sz, all in setting of hyponatremia. Has had more times of hyponatremia. Sz only when sodium is low. First time sz 2023. Lowest sodium was 118 per brother. Most if not all of his hospitalizations were in [place]. I don't have recent brain images, just report.    Never had sz when younger.    2021 MRI brain showed bifrontal encephalomalacia, suggestion of possible prior TBI. Brother today states he had episode of hitting his head in 2014.    Also here wanting to know what's going on with his \"dementia\". On review of records, he " "carries a diagnosis of dementia possibly due to vascular as well as alcohol. He was previously evaluated and seen in the memory clinic, it appears last was in 2022.  When last seen by memory clinic 6/2022. Dementia poss due to vascular disease and prior alcoholism. Trialed on rivastigmine patches. Trauma? From falls when drunk. Formal driving evaluation recommended.    Over the years, memory has not been worse, intermittently some days better.    Seeing psychiatrist in [place], \"Seth COLLINS\" (unsure of name) in [facility].    Not driving. He is able to make food (simple sandwich), but brother does most of the things for him. Sometimes he gets mixed up. Not burning things. He is able to take a bath by himself or changes himself.    Per brother, psychiatrist took him off depakote. Was on keppra, psych stopped it as well.    He apparently drank heavy alcohol in the past, stopped for about 20 years, and relapsed per brother Giorgi.    S/p donepezil--upset stomach  Rivastigmine patch--unclear if he ever took that or what happened  Has not tried memantine    Daily smoker still. No alcohol, quit 3 years ago now. No street drug use.      Review of Systems   Constitutional:  Negative for appetite change, chills and fever.   HENT:  Negative for ear pain and nosebleeds.    Eyes:  Negative for discharge and itching.   Respiratory:  Negative for choking and chest tightness.    Cardiovascular:  Negative for chest pain and palpitations.   Gastrointestinal:  Negative for abdominal distention, abdominal pain and nausea.   Endocrine: Negative for cold intolerance and heat intolerance.   Genitourinary:  Negative for difficulty urinating and dysuria.   Musculoskeletal:  Negative for gait problem and myalgias.   Neurological:  Negative for dizziness, seizures and numbness.     Problem List[1]    Medical History[2]  Surgical History[3]    Social History     Tobacco Use   • Smoking status: Every Day     Current packs/day: 1.00     Average " packs/day: 1 pack/day for 40.0 years (40.0 ttl pk-yrs)     Types: Cigarettes   • Smokeless tobacco: Never   Substance Use Topics   • Alcohol use: Not Currently     family history includes Heart attack in his brother and father; Lung cancer in his mother.    Allergies[4]  Current Medications[5]    Objective    /89   Pulse 66   Wt 73.8 kg (162 lb 11.2 oz)   BMI 23.35 kg/m²     Awake, alert, oriented, in no distress  Appears forgetful  Well-nourished, ambulatory independently  No leg edema    Mental status exam as above, conversant   Pupils round reactive to light, 3-4 mm, (-) RAPD   Fundoscopic examination was attempted but fundus was not visualized bilaterally   Full EOMs intact, no nystagmus, no ptosis   V1 to V3 sensation is intact   No facial droop   Hearing grossly intact   No dysarthria  Good shoulder shrug bilaterally   Tongue is midline     Motor strength is 5/5 on all extremities, tone/bulk normal   Reflexes 1+ on BUE, 1+ on B knees, absent on B ankles, downgoing toes bilaterally   Sensation is intact to light touch, vibration on all 4 extremities except dec vib B big toes  Finger to nose test intact bilaterally   Negative Romberg sign   Normal gait       Lab Results   Component Value Date    WBC 7.5 04/03/2023    RBC 4.81 04/03/2023    HGB 14.2 04/03/2023    HCT 45.2 04/03/2023     04/03/2023     04/03/2023    K 5.1 04/03/2023     04/03/2023    BUN 15 04/03/2023    CREATININE 1.29 04/03/2023    CALCIUM 10.4 04/03/2023    ALKPHOS 72 04/03/2023    AST 30 04/03/2023    ALT 30 04/03/2023    GHYBLWQN35 490 12/20/2021    HGBA1C 5.4 12/29/2022    LDLCALC 104 (H) 01/29/2024    CHOL 172 01/29/2024    HDL 58.3 01/29/2024    TRIG 49 01/29/2024    TSH 2.17 12/29/2022        MR brain wo IV contrast 11/16/2024    Narrative  EXAMINATION:  MRI OF THE BRAIN WITHOUT CONTRAST  11/16/2024 8:27 am    TECHNIQUE:  Multiplanar multisequence MRI of the brain was performed without the  administration of  intravenous contrast.    COMPARISON:  CT brain 11/12/2024    HISTORY:  ORDERING SYSTEM PROVIDED HISTORY: AMS  TECHNOLOGIST PROVIDED HISTORY:  Reason for exam:->AMS    FINDINGS:  INTRACRANIAL STRUCTURES/VENTRICLES: There are no areas of restricted  diffusion identified to suggest an acute infarct.  There is no acute  intracranial hemorrhage.  No mass effect or midline shift is present.  Encephalomalacia within the anterior inferior frontal lobes is present.  There is hemosiderin deposition suggesting the sequelae of a previous  traumatic injury.    Multiple foci of abnormal increased T2/FLAIR signal intensity are present  within the periventricular, subcortical and deep white matter of both  hemispheres.  Similar signal abnormality is present within the josette.  There  is no sellar or suprasellar mass present.  There is no ventriculomegaly or  abnormal extra-axial fluid collection present.  The proximal portions of the  [place] demonstrate normal flow voids.    ORBITS: Limited evaluation of the orbits is unremarkable.    SINUSES: The paranasal sinuses and mastoid air cells are clear.    BONES/SOFT TISSUES: Bone marrow signal intensity is normal.    Impression  1. No acute intracranial process identified.  2. Posttraumatic encephalomalacia within the frontal lobes.  3. Mild chronic small vessel ischemic changes.      Assessment/Plan    Dementia, by history, with behavioral disturbance  Bifrontal encephalomalacia on head imaging--? Post-traumatic  Hx alcohol abuse  Tobacco use disorder  Discussed with brother, I will not be reinventing the wheel here. He had been seen by memory clinic in the past and carried this diagnosis. Alcohol likely has a role in this.  Brother asks if he had Wernicke encephalopathy. Discussed, that is not something I can ascertain at this time that may have happened in the past, but given clinical situation in the past it is a POSSIBILITY--no way to ascertain  In addition to dementia  symptoms, it appears he has some impulsive behavior, which MAY be related to, or due to, bifrontal encephalomalacia. I advised brother to discuss this with his psychiatry practitioner as he may benefit from being medicated for this. This is beyond the scope of my practice.  S/p donepezil  ? Trial of rivastigmine patch in past--? What happened, ? If tried  Has not been on memantine per brother  Discussed poss trial of memantine, poss SE discussed, realistic expectations discussed--pt brother wants him to try    5. Seizure, symptomatic  6. Hyponatremia  7. Psychogenic polydipsia  #5 likely due to #6, likely due to #7, in setting of chronic bifrontal ?? Traumatic (vs non-traumatic) encephalomalacia (which probably is a predisposition for possible sz for him)  Discussed, symptomatic seizure related to hyponatremia is not treated chronically with anti-seizure medication  Discussed, seizure related to alcohol use, or poss alcohol withdrawal, is not treated chronically with anti-seizure medication  I advised brother to discuss his psychogenic polydipsia with his psychiatry practitioner  Will do EEG--no anti-seizure medication for now    Plans:  Get last brain imaging images from [facility] review--[facility], 11/2024  2.   Do EEG  3.   If you let me know name of his psychiatry practitioner I can send over my clinic note to his office if you would like  4.   Try memantine 5mg daily--report to my office in 3 weeks see how you're doing, if no SE will plan to increase to 5mg bid  5.   Discuss with your psychiatry practitioner about your impulsiveness and psychogenic polydipsia  6.   Pt not driving  7.   Discussed, pt cannot be on smoking cessation pills (ie bupropion, varenicline, etc) due to poss SE of sz    Rtc after 2 mo    All questions were answered.  Pt knows how to contact my office in case pt has any questions or concerns.    Bj Mancia MD              [1]  Patient Active Problem List  Diagnosis   • Abnormal  thyroid function test   • Anemia   • Anxiety   • Cigarette smoker   • Coronary artery stenosis   • Elevated WBC count   • Elevated liver enzymes   • Edema   • Diastolic dysfunction   • Dementia associated with alcoholism with behavioral disturbance   • Hyperlipidemia   • Hypertension   • Hyponatremia   • IFG (impaired fasting glucose)   • Night muscle spasms   • Other microscopic hematuria   • Rapid weight loss   • Recurrent major depressive disorder, in partial remission (CMS-HCC)   • Vitamin D deficiency   • Difficulty in walking, not elsewhere classified   • H/O ETOH abuse   • Muscle weakness   • Other symbolic dysfunctions   • Pure hypercholesterolemia   • S/P CABG (coronary artery bypass graft)   • Urinary frequency   • Psychogenic polydipsia   • Routine general medical examination at a health care facility   • Encounter for screening for malignant neoplasm of colon   • Screening for prostate cancer   • Great toe pain, left   • Centrilobular emphysema (Multi)   • Pulmonary nodules   • Seizure (Multi)   • Generalized anxiety disorder   [2]  Past Medical History:  Diagnosis Date   • Abnormal LFTs 01/25/2023   • Alcohol dependency (Multi) 01/25/2023   • 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   • BRANDI positive 01/25/2023   • Closed fracture of right hip 01/25/2023   • Closed fracture of sixth thoracic vertebra (Multi) 01/30/2024   • Creatinine elevation 01/25/2023   • GERD (gastroesophageal reflux disease) 01/25/2023   • Hypercalcemia 01/25/2023   • Hyponatremia 01/25/2023   • Myocardial infarction (Multi) 01/25/2023   • Pericarditis, acute ([facility]) 01/25/2023   • Personal history of other diseases of the circulatory system 06/27/2019    History of coronary artery disease   • Personal history of other endocrine, nutritional and metabolic disease 04/18/2019    History of hyperlipidemia   • Pneumothorax, iatrogenic 01/25/2023   • Stage 3a chronic kidney disease (Multi) 01/25/2023   • Unspecified dementia, unspecified  severity, without behavioral disturbance, psychotic disturbance, mood disturbance, and anxiety 07/15/2021   [3]  Past Surgical History:  Procedure Laterality Date   • CORONARY ARTERY BYPASS GRAFT  03/28/2014    Coronary Artery Surgery   • CORONARY ARTERY BYPASS GRAFT  09/08/2014    CABG   • SHOULDER SURGERY  03/28/2014    Shoulder Surgery   • VASECTOMY  03/28/2014    Surgery Vas Deferens Vasectomy   [4]  Allergies  Allergen Reactions   • Haldol [Haloperidol] Other     Becomes violent   [5]    Current Outpatient Medications:   •  atorvastatin (Lipitor) 40 mg tablet, Take 1.5 tablets (60 mg) by mouth once every day., Disp: 90 tablet, Rfl: 0  •  clonazePAM (KlonoPIN) 0.5 mg tablet, Take 1 tablet (0.5 mg) by mouth 2 times a day as needed for anxiety., Disp: , Rfl:   •  escitalopram (Lexapro) 10 mg tablet, 1 tablet (10 mg)., Disp: , Rfl:   •  metoprolol tartrate (Lopressor) 25 mg tablet, Take 1 tablet (25 mg) by mouth 2 times a day., Disp: 180 tablet, Rfl: 0  •  thiamine (Vitamin B-1) 50 mg tablet, Take 1 tablet (50 mg) by mouth once daily., Disp: , Rfl:   •  divalproex sprinkle (Depakote Sprinkle) 125 mg DR capsule, Take 1 capsule (125 mg) by mouth 2 times a day. (Patient not taking: Reported on 4/28/2025), Disp: , Rfl:   •  melatonin 5 mg tablet,chewable, Chew. (Patient not taking: Reported on 4/28/2025), Disp: , Rfl:   •  memantine (Namenda) 5 mg tablet, Take 1 tablet (5 mg) by mouth once daily., Disp: 30 tablet, Rfl: 0  •  nicotine (Nicoderm CQ) 21 mg/24 hr patch, Place 1 patch over 24 hours on the skin once every 24 hours., Disp: 60 patch, Rfl: 0    Current Facility-Administered Medications:   •  ciclopirox (Penlac) 8 % solution, , Topical, Once, Luis Downey DPM       [1]  Patient Active Problem List  Diagnosis   • Abnormal thyroid function test   • Anemia   • Anxiety   • Cigarette smoker   • Coronary artery stenosis   • Elevated WBC count   • Elevated liver enzymes   • Edema   • Diastolic dysfunction   •  Dementia associated with alcoholism with behavioral disturbance   • Hyperlipidemia   • Hypertension   • Hyponatremia   • IFG (impaired fasting glucose)   • Night muscle spasms   • Other microscopic hematuria   • Rapid weight loss   • Recurrent major depressive disorder, in partial remission (CMS-HCC)   • Vitamin D deficiency   • Difficulty in walking, not elsewhere classified   • H/O ETOH abuse   • Muscle weakness   • Other symbolic dysfunctions   • Pure hypercholesterolemia   • S/P CABG (coronary artery bypass graft)   • Urinary frequency   • Psychogenic polydipsia   • Routine general medical examination at a health care facility   • Encounter for screening for malignant neoplasm of colon   • Screening for prostate cancer   • Great toe pain, left   • Centrilobular emphysema (Multi)   • Pulmonary nodules   • Seizure (Multi)   • Generalized anxiety disorder   [2]  Past Medical History:  Diagnosis Date   • Abnormal LFTs 01/25/2023   • Alcohol dependency (Multi) 01/25/2023   • 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   • BRANDI positive 01/25/2023   • Closed fracture of right hip 01/25/2023   • Closed fracture of sixth thoracic vertebra (Multi) 01/30/2024   • Creatinine elevation 01/25/2023   • GERD (gastroesophageal reflux disease) 01/25/2023   • Hypercalcemia 01/25/2023   • Hyponatremia 01/25/2023   • Myocardial infarction (Multi) 01/25/2023   • Pericarditis, acute ([facility]) 01/25/2023   • Personal history of other diseases of the circulatory system 06/27/2019    History of coronary artery disease   • Personal history of other endocrine, nutritional and metabolic disease 04/18/2019    History of hyperlipidemia   • Pneumothorax, iatrogenic 01/25/2023   • Stage 3a chronic kidney disease (Multi) 01/25/2023   • Unspecified dementia, unspecified severity, without behavioral disturbance, psychotic disturbance, mood disturbance, and anxiety 07/15/2021   [3]  Past Surgical History:  Procedure Laterality Date   • CORONARY ARTERY  BYPASS GRAFT  03/28/2014    Coronary Artery Surgery   • CORONARY ARTERY BYPASS GRAFT  09/08/2014    CABG   • SHOULDER SURGERY  03/28/2014    Shoulder Surgery   • VASECTOMY  03/28/2014    Surgery Vas Deferens Vasectomy   [4]  Allergies  Allergen Reactions   • Haldol [Haloperidol] Other     Becomes violent   [5]    Current Outpatient Medications:   •  atorvastatin (Lipitor) 40 mg tablet, Take 1.5 tablets (60 mg) by mouth once every day., Disp: 90 tablet, Rfl: 0  •  clonazePAM (KlonoPIN) 0.5 mg tablet, Take 1 tablet (0.5 mg) by mouth 2 times a day as needed for anxiety., Disp: , Rfl:   •  escitalopram (Lexapro) 10 mg tablet, 1 tablet (10 mg)., Disp: , Rfl:   •  metoprolol tartrate (Lopressor) 25 mg tablet, Take 1 tablet (25 mg) by mouth 2 times a day., Disp: 180 tablet, Rfl: 0  •  thiamine (Vitamin B-1) 50 mg tablet, Take 1 tablet (50 mg) by mouth once daily., Disp: , Rfl:   •  divalproex sprinkle (Depakote Sprinkle) 125 mg DR capsule, Take 1 capsule (125 mg) by mouth 2 times a day. (Patient not taking: Reported on 4/28/2025), Disp: , Rfl:   •  melatonin 5 mg tablet,chewable, Chew. (Patient not taking: Reported on 4/28/2025), Disp: , Rfl:   •  memantine (Namenda) 5 mg tablet, Take 1 tablet (5 mg) by mouth once daily., Disp: 30 tablet, Rfl: 0  •  nicotine (Nicoderm CQ) 21 mg/24 hr patch, Place 1 patch over 24 hours on the skin once every 24 hours., Disp: 60 patch, Rfl: 0    Current Facility-Administered Medications:   •  ciclopirox (Penlac) 8 % solution, , Topical, Once, Luis Downey DPM       [1]  Patient Active Problem List  Diagnosis   • Abnormal thyroid function test   • Anemia   • Anxiety   • Cigarette smoker   • Coronary artery stenosis   • Elevated WBC count   • Elevated liver enzymes   • Edema   • Diastolic dysfunction   • Dementia associated with alcoholism with behavioral disturbance   • Hyperlipidemia   • Hypertension   • Hyponatremia   • IFG (impaired fasting glucose)   • Night muscle spasms   • Other  microscopic hematuria   • Rapid weight loss   • Recurrent major depressive disorder, in partial remission (CMS-HCC)   • Vitamin D deficiency   • Difficulty in walking, not elsewhere classified   • H/O ETOH abuse   • Muscle weakness   • Other symbolic dysfunctions   • Pure hypercholesterolemia   • S/P CABG (coronary artery bypass graft)   • Urinary frequency   • Psychogenic polydipsia   • Routine general medical examination at a health care facility   • Encounter for screening for malignant neoplasm of colon   • Screening for prostate cancer   • Great toe pain, left   • Centrilobular emphysema (Multi)   • Pulmonary nodules   • Seizure (Multi)   • Generalized anxiety disorder   [2]  Past Medical History:  Diagnosis Date   • Abnormal LFTs 01/25/2023   • Alcohol dependency (Multi) 01/25/2023   • 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   • BRANDI positive 01/25/2023   • Closed fracture of right hip 01/25/2023   • Closed fracture of sixth thoracic vertebra (Multi) 01/30/2024   • Creatinine elevation 01/25/2023   • GERD (gastroesophageal reflux disease) 01/25/2023   • Hypercalcemia 01/25/2023   • Hyponatremia 01/25/2023   • Myocardial infarction (Multi) 01/25/2023   • Pericarditis, acute ([facility]) 01/25/2023   • Personal history of other diseases of the circulatory system 06/27/2019    History of coronary artery disease   • Personal history of other endocrine, nutritional and metabolic disease 04/18/2019    History of hyperlipidemia   • Pneumothorax, iatrogenic 01/25/2023   • Stage 3a chronic kidney disease (Multi) 01/25/2023   • Unspecified dementia, unspecified severity, without behavioral disturbance, psychotic disturbance, mood disturbance, and anxiety 07/15/2021   [3]  Past Surgical History:  Procedure Laterality Date   • CORONARY ARTERY BYPASS GRAFT  03/28/2014    Coronary Artery Surgery   • CORONARY ARTERY BYPASS GRAFT  09/08/2014    CABG   • SHOULDER SURGERY  03/28/2014    Shoulder Surgery   • VASECTOMY  03/28/2014     Surgery Vas Deferens Vasectomy   [4]  Allergies  Allergen Reactions   • Haldol [Haloperidol] Other     Becomes violent   [5]    Current Outpatient Medications:   •  atorvastatin (Lipitor) 40 mg tablet, Take 1.5 tablets (60 mg) by mouth once every day., Disp: 90 tablet, Rfl: 0  •  clonazePAM (KlonoPIN) 0.5 mg tablet, Take 1 tablet (0.5 mg) by mouth 2 times a day as needed for anxiety., Disp: , Rfl:   •  escitalopram (Lexapro) 10 mg tablet, 1 tablet (10 mg)., Disp: , Rfl:   •  metoprolol tartrate (Lopressor) 25 mg tablet, Take 1 tablet (25 mg) by mouth 2 times a day., Disp: 180 tablet, Rfl: 0  •  thiamine (Vitamin B-1) 50 mg tablet, Take 1 tablet (50 mg) by mouth once daily., Disp: , Rfl:   •  divalproex sprinkle (Depakote Sprinkle) 125 mg DR capsule, Take 1 capsule (125 mg) by mouth 2 times a day. (Patient not taking: Reported on 4/28/2025), Disp: , Rfl:   •  melatonin 5 mg tablet,chewable, Chew. (Patient not taking: Reported on 4/28/2025), Disp: , Rfl:   •  memantine (Namenda) 5 mg tablet, Take 1 tablet (5 mg) by mouth once daily., Disp: 30 tablet, Rfl: 0  •  nicotine (Nicoderm CQ) 21 mg/24 hr patch, Place 1 patch over 24 hours on the skin once every 24 hours., Disp: 60 patch, Rfl: 0    Current Facility-Administered Medications:   •  ciclopirox (Penlac) 8 % solution, , Topical, Once, Luis Downey DPM

## 2025-04-30 ENCOUNTER — DOCUMENTATION (OUTPATIENT)
Dept: NEUROLOGY | Facility: HOSPITAL | Age: 65
End: 2025-04-30
Payer: MEDICARE

## 2025-05-01 NOTE — PROGRESS NOTES
Neuro Brief Note  Received and reviewed imaging from  E dated 11/12/24 11/12/24 MRI brain wo: no acute intracranial process. Post-traumatic encephalomalacia within the frontal lobes. Mild chronic small vessel ischemic changes.    Grossly when compared to 2021 MRI images in PACS, looks about similar.  Dr Mancia

## 2025-05-08 ENCOUNTER — APPOINTMENT (OUTPATIENT)
Dept: NEUROLOGY | Facility: HOSPITAL | Age: 65
End: 2025-05-08
Payer: MEDICARE

## 2025-05-13 ENCOUNTER — HOSPITAL ENCOUNTER (OUTPATIENT)
Dept: NEUROLOGY | Facility: HOSPITAL | Age: 65
Discharge: HOME | End: 2025-05-13
Payer: MEDICARE

## 2025-05-13 DIAGNOSIS — R56.9 SEIZURE (MULTI): ICD-10-CM

## 2025-05-13 DIAGNOSIS — E87.1 HYPONATREMIA: ICD-10-CM

## 2025-05-13 PROCEDURE — 95819 EEG AWAKE AND ASLEEP: CPT

## 2025-05-13 PROCEDURE — 95819 EEG AWAKE AND ASLEEP: CPT | Performed by: PSYCHIATRY & NEUROLOGY

## 2025-07-02 ENCOUNTER — OFFICE VISIT (OUTPATIENT)
Dept: NEUROLOGY | Facility: HOSPITAL | Age: 65
End: 2025-07-02
Payer: MEDICARE

## 2025-07-02 VITALS
WEIGHT: 156.5 LBS | DIASTOLIC BLOOD PRESSURE: 78 MMHG | HEART RATE: 81 BPM | BODY MASS INDEX: 22.46 KG/M2 | SYSTOLIC BLOOD PRESSURE: 124 MMHG

## 2025-07-02 DIAGNOSIS — F02.818: Primary | ICD-10-CM

## 2025-07-02 DIAGNOSIS — E87.1 HYPONATREMIA: ICD-10-CM

## 2025-07-02 DIAGNOSIS — F54 PSYCHOGENIC POLYDIPSIA: ICD-10-CM

## 2025-07-02 DIAGNOSIS — R63.1 PSYCHOGENIC POLYDIPSIA: ICD-10-CM

## 2025-07-02 DIAGNOSIS — R56.9 SEIZURE (MULTI): ICD-10-CM

## 2025-07-02 DIAGNOSIS — G93.89 ENCEPHALOMALACIA ON IMAGING STUDY: ICD-10-CM

## 2025-07-02 PROCEDURE — 99215 OFFICE O/P EST HI 40 MIN: CPT | Performed by: PSYCHIATRY & NEUROLOGY

## 2025-07-02 PROCEDURE — 1160F RVW MEDS BY RX/DR IN RCRD: CPT | Performed by: PSYCHIATRY & NEUROLOGY

## 2025-07-02 PROCEDURE — 1126F AMNT PAIN NOTED NONE PRSNT: CPT | Performed by: PSYCHIATRY & NEUROLOGY

## 2025-07-02 PROCEDURE — 3078F DIAST BP <80 MM HG: CPT | Performed by: PSYCHIATRY & NEUROLOGY

## 2025-07-02 PROCEDURE — 3074F SYST BP LT 130 MM HG: CPT | Performed by: PSYCHIATRY & NEUROLOGY

## 2025-07-02 PROCEDURE — 1159F MED LIST DOCD IN RCRD: CPT | Performed by: PSYCHIATRY & NEUROLOGY

## 2025-07-02 RX ORDER — ASPIRIN 81 MG/1
81 TABLET ORAL DAILY
COMMUNITY

## 2025-07-02 ASSESSMENT — ENCOUNTER SYMPTOMS
LOSS OF SENSATION IN FEET: 0
DEPRESSION: 0
OCCASIONAL FEELINGS OF UNSTEADINESS: 1

## 2025-07-02 ASSESSMENT — PAIN SCALES - GENERAL: PAINLEVEL_OUTOF10: 0-NO PAIN

## 2025-07-02 NOTE — PATIENT INSTRUCTIONS
OT order written per brother request  2.   Follow-up with psychiatry  3.   ? Respite care    Rtc as needed in gen neuro clinic

## 2025-07-02 NOTE — LETTER
"July 3, 2025     Matthieu Winn MD  [address] [facility] [id number]    Patient: Saturnino Cooper   YOB: 1960   Date of Visit: 7/2/2025       Dear Dr. Matthieu Winn MD:    Thank you for referring Saturnino Cooper to me for evaluation. Below are my notes for this consultation.  If you have questions, please do not hesitate to call me. I look forward to following your patient along with you.       Sincerely,     Bj Mancia MD      CC: No Recipients  ______________________________________________________________________________________    Follow-up visit    Visit type: Follow-up    PCP: Matthieu Winn MD.    Subjective    Saturnino Cooper is a 64 y.o. year old male here for follow-up. Last seen 4/28/2025.     Patient is accompanied by brother.       HPI    I first saw him 4/28/25 for \"seizures\" and \"dementia\". Came late seen anyway. No SLUMS done due to no time. Per brother, referring PCP Dr Hicks \"fired\" patient as he wasn't following thru with neurology recommendations. Brother is bringing him back in today so this doesn't happen again, and since he is responsible for patient's welfare. Pt unable to remember and give much history. Brother (who lives with pt) gives most of history. Would drink too much water. Has had 4x sz, all in setting of hyponatremia. Has had more times of hyponatremia. Sz only when sodium is low. First time sz 2023. Lowest sodium was 118 per brother. Most if not all of his hospitalizations were in [place]. I don't have recent brain images, just report. Never had sz when younger. 2021 MRI brain showed bifrontal encephalomalacia, suggestion of possible prior TBI. Brother today states he had episode of hitting his head in 2014. Also here wanting to know what's going on with his \"dementia\". On review of records, he carries a diagnosis of dementia possibly due to vascular as well as alcohol. He was previously evaluated and seen in " "the memory clinic, it appears last was in 2022.  When last seen by memory clinic 6/2022. Dementia poss due to vascular disease and prior alcoholism. Trialed on rivastigmine patches. Trauma? From falls when drunk. Formal driving evaluation recommended. Over the years, memory has not been worse, intermittently some days better. Seeing psychiatrist in [place], \"Seth COLLINS\" (unsure of name) in [facility]. Not driving. He is able to make food (simple sandwich), but brother does most of the things for him. Sometimes he gets mixed up. Not burning things. He is able to take a bath by himself or changes himself. Per brother, psychiatrist took him off depakote. Was on keppra, psych stopped it as well. He apparently drank heavy alcohol in the past, stopped for about 20 years, and relapsed per brother Giorgi.   S/p donepezil--upset stomach  Rivastigmine patch--unclear if he ever took that or what happened  Has not tried memantine   Daily smoker still. No alcohol, quit 3 years ago now. No street drug use. During last visit, advised to let my office know of who he is seeing for psychiatry, get last brain imaging from [facility] for review, try memantine 5mg daily and report to office in 3 weeks, discuss with psychiatrist about impulsiveness and psychogenic polydipsia, and pt cannot be on smoking cessation pills due to poss SE of sz. EEG ordered.    Since last visit, EEG normal. Received and reviewed imaging from [facility] dated 11/12/24 11/12/24 MRI brain wo: no acute intracranial process. Post-traumatic encephalomalacia within the frontal lobes. Mild chronic small vessel ischemic changes.  Grossly when I compared to 2021 MRI images in PACS, looks about similar.    Here for followup, came with female and brother.    Did not call office with information.    Says tried memantine for 1 week, was dizzy/had HA, \"difficulty swallowing\", stopped med. Pt/brother did not call office or leave message about it. Brother said he may have called " "office, but there was no log of call in his chart.    Brother asking how to get pt in to a temporary nursing home. Insists he was told (? By insurance) that pt needs an \"order for inpatient OT\" somehow. Brother is so tired of taking care of him. Pt apparently still needs a lot of supervision for his psychogenic polydipsia. PCP apparently told brother to \"ask neurologist\" about this.       Problem List[1]    Allergies[2]  Current Medications[3]     Objective    /78   Pulse 81   Wt 71 kg (156 lb 8 oz)   BMI 22.46 kg/m²        Awake, alert, oriented x3, in no distress  Well-nourished, ambulatory independently    Mental status exam as above, conversant    No facial droop   Hearing grossly intact   No dysarthria    Motor strength is at least antigravity on all extremities  Normal gait      Lab Results   Component Value Date    WBC 7.5 04/03/2023    RBC 4.81 04/03/2023    HGB 14.2 04/03/2023    HCT 45.2 04/03/2023     04/03/2023     04/03/2023    K 5.1 04/03/2023     04/03/2023    BUN 15 04/03/2023    CREATININE 1.29 04/03/2023    CALCIUM 10.4 04/03/2023    ALKPHOS 72 04/03/2023    AST 30 04/03/2023    ALT 30 04/03/2023    FOUTDLUP89 490 12/20/2021    HGBA1C 5.4 12/29/2022    LDLCALC 104 (H) 01/29/2024    CHOL 172 01/29/2024    HDL 58.3 01/29/2024    TRIG 49 01/29/2024    TSH 2.17 12/29/2022        EEG 05/16/2025    Impression  IMPRESSION    Impression    This awake EEG is normal. No epileptiform discharges or lateralizing signs are seen.  A full report will be scanned into the patient's chart at a later time.    This report has been interpreted and electronically signed by      Assessment/Plan    Dementia, by history, with behavioral disturbance  Bifrontal encephalomalacia on head imaging--? Post-traumatic vs other  Hx alcohol abuse  Tobacco use disorder  Pt had been seen by memory clinic in the past and carried this diagnosis. Alcohol likely has a role in this.  Previously discussed, Wernickniyah " "encephalopathy is not something I can ascertain at this time that may have happened in the past, but given clinical situation in the past it is a POSSIBILITY--no way to ascertain  In addition to dementia symptoms, it appears he has some impulsive behavior, which MAY be related to, or due to, bifrontal encephalomalacia.   I previously advised brother to discuss this with his psychiatry practitioner as he may benefit from being medicated for this. This is beyond the scope of my practice.  S/p donepezil  ? Trial of rivastigmine patch in past--? What happened, ? If tried  S/p memantine--says had HA/dizzy/diff swallowing  Discussed with pt/brother today, HA/dizziness are plausible SE, I am not aware why it would cause difficulty swallowing  Discussed, I don't have other medications to try for him     5. Seizure, symptomatic  6. Hyponatremia  7. Psychogenic polydipsia  EEG normal  #5 likely due to #6, which in turn was likely due to #7, in setting of chronic bifrontal ?? Traumatic (vs non-traumatic) encephalomalacia (which probably is a predisposition for possible sz for him)  Previously discussed, symptomatic seizure related to hyponatremia is not treated chronically with anti-seizure medication  Previously discussed, seizure related to alcohol use, or poss alcohol withdrawal, is not treated chronically with anti-seizure medication as well    8. Caregiver fatigue  Brother asking for \"order for inpatient OT for short term nursing home stay\", because brother needs a break from taking care of him  I discussed with brother I am not aware of such a pathway, and have not heard of that. I am happy to write him an order as he requests, but I do not know how or where that goes. Brother states he had called (or will call?) nursing home  Discussed, outside of scenario after acute medical hospitalization, psychiatric admission, or possible hospice, I am not aware of a way to get patient in to a nursing home for the indication " "brother is vocalizing  Discussed, sounds like he is describing getting respite care  I suggested to brother to discuss with PCP, who may have insight as to what he is asking for and trying to achieve, but brother tells me he already did and PCP apparently told him to \"discuss with neurologist\"  I suggested to brother as well to discuss his issues with psychiatrist       Plans:  OT order written per brother request  2.   Follow-up with psychiatry  3.   ? Respite care    Rtc as needed in gen neuro clinic    All questions were answered.  Pt knows how to contact my office in case pt has any questions or concerns.    Bj Mancia MD                [1]  Patient Active Problem List  Diagnosis   • Abnormal thyroid function test   • Anemia   • Anxiety   • Cigarette smoker   • Coronary artery stenosis   • Elevated WBC count   • Elevated liver enzymes   • Edema   • Diastolic dysfunction   • Dementia associated with alcoholism with behavioral disturbance   • Hyperlipidemia   • Hypertension   • Hyponatremia   • IFG (impaired fasting glucose)   • Night muscle spasms   • Other microscopic hematuria   • Rapid weight loss   • Recurrent major depressive disorder, in partial remission   • Vitamin D deficiency   • Difficulty in walking, not elsewhere classified   • H/O ETOH abuse   • Muscle weakness   • Other symbolic dysfunctions   • Pure hypercholesterolemia   • S/P CABG (coronary artery bypass graft)   • Urinary frequency   • Psychogenic polydipsia   • Routine general medical examination at a health care facility   • Encounter for screening for malignant neoplasm of colon   • Screening for prostate cancer   • Great toe pain, left   • Centrilobular emphysema (Multi)   • Pulmonary nodules   • Seizure (Multi)   • Generalized anxiety disorder   [2]  Allergies  Allergen Reactions   • Haldol [Haloperidol] Other     Becomes violent   [3]    Current Outpatient Medications:   •  aspirin 81 mg EC tablet, Take 1 tablet (81 mg) by mouth once " daily., Disp: , Rfl:   •  atorvastatin (Lipitor) 40 mg tablet, Take 1.5 tablets (60 mg) by mouth once every day., Disp: 90 tablet, Rfl: 0  •  clonazePAM (KlonoPIN) 0.5 mg tablet, Take 1 tablet (0.5 mg) by mouth 2 times a day as needed for anxiety., Disp: , Rfl:   •  escitalopram (Lexapro) 10 mg tablet, 1 tablet (10 mg)., Disp: , Rfl:   •  metoprolol tartrate (Lopressor) 25 mg tablet, Take 1 tablet (25 mg) by mouth 2 times a day., Disp: 180 tablet, Rfl: 0  •  thiamine (Vitamin B-1) 50 mg tablet, Take 1 tablet (50 mg) by mouth once daily., Disp: , Rfl:   •  memantine (Namenda) 5 mg tablet, Take 1 tablet (5 mg) by mouth once daily. (Patient not taking: Reported on 7/2/2025), Disp: 30 tablet, Rfl: 0    Current Facility-Administered Medications:   •  ciclopirox (Penlac) 8 % solution, , Topical, Once, Luis Downey DPM

## 2025-07-02 NOTE — PROGRESS NOTES
"Follow-up visit    Visit type: Follow-up    PCP: Matthieu Winn MD.    Subjective     Saturnino Cooper is a 64 y.o. year old male here for follow-up. Last seen 4/28/2025.     Patient is accompanied by brother.       HPI    I first saw him 4/28/25 for \"seizures\" and \"dementia\". Came late seen anyway. No SLUMS done due to no time. Per brother, referring PCP Dr Hicks \"fired\" patient as he wasn't following thru with neurology recommendations. Brother is bringing him back in today so this doesn't happen again, and since he is responsible for patient's welfare. Pt unable to remember and give much history. Brother (who lives with pt) gives most of history. Would drink too much water. Has had 4x sz, all in setting of hyponatremia. Has had more times of hyponatremia. Sz only when sodium is low. First time sz 2023. Lowest sodium was 118 per brother. Most if not all of his hospitalizations were in [place]. I don't have recent brain images, just report. Never had sz when younger. 2021 MRI brain showed bifrontal encephalomalacia, suggestion of possible prior TBI. Brother today states he had episode of hitting his head in 2014. Also here wanting to know what's going on with his \"dementia\". On review of records, he carries a diagnosis of dementia possibly due to vascular as well as alcohol. He was previously evaluated and seen in the memory clinic, it appears last was in 2022.  When last seen by memory clinic 6/2022. Dementia poss due to vascular disease and prior alcoholism. Trialed on rivastigmine patches. Trauma? From falls when drunk. Formal driving evaluation recommended. Over the years, memory has not been worse, intermittently some days better. Seeing psychiatrist in [place], \"Seth COLLINS\" (unsure of name) in [facility]. Not driving. He is able to make food (simple sandwich), but brother does most of the things for him. Sometimes he gets mixed up. Not burning things. He is able to take a bath by himself or " "changes himself. Per brother, psychiatrist took him off depakote. Was on keppra, psych stopped it as well. He apparently drank heavy alcohol in the past, stopped for about 20 years, and relapsed per brother Giorgi.   S/p donepezil--upset stomach  Rivastigmine patch--unclear if he ever took that or what happened  Has not tried memantine   Daily smoker still. No alcohol, quit 3 years ago now. No street drug use. During last visit, advised to let my office know of who he is seeing for psychiatry, get last brain imaging from [facility] for review, try memantine 5mg daily and report to office in 3 weeks, discuss with psychiatrist about impulsiveness and psychogenic polydipsia, and pt cannot be on smoking cessation pills due to poss SE of sz. EEG ordered.    Since last visit, EEG normal. Received and reviewed imaging from [facility] dated 11/12/24 11/12/24 MRI brain wo: no acute intracranial process. Post-traumatic encephalomalacia within the frontal lobes. Mild chronic small vessel ischemic changes.  Grossly when I compared to 2021 MRI images in PACS, looks about similar.    Here for followup, came with female and brother.    Did not call office with information.    Says tried memantine for 1 week, was dizzy/had HA, \"difficulty swallowing\", stopped med. Pt/brother did not call office or leave message about it. Brother said he may have called office, but there was no log of call in his chart.    Brother asking how to get pt in to a temporary nursing home. Insists he was told (? By insurance) that pt needs an \"order for inpatient OT\" somehow. Brother is so tired of taking care of him. Pt apparently still needs a lot of supervision for his psychogenic polydipsia. PCP apparently told brother to \"ask neurologist\" about this.       Problem List[1]    Allergies[2]  Current Medications[3]     Objective     /78   Pulse 81   Wt 71 kg (156 lb 8 oz)   BMI 22.46 kg/m²        Awake, alert, oriented x3, in no " distress  Well-nourished, ambulatory independently    Mental status exam as above, conversant    No facial droop   Hearing grossly intact   No dysarthria    Motor strength is at least antigravity on all extremities  Normal gait      Lab Results   Component Value Date    WBC 7.5 04/03/2023    RBC 4.81 04/03/2023    HGB 14.2 04/03/2023    HCT 45.2 04/03/2023     04/03/2023     04/03/2023    K 5.1 04/03/2023     04/03/2023    BUN 15 04/03/2023    CREATININE 1.29 04/03/2023    CALCIUM 10.4 04/03/2023    ALKPHOS 72 04/03/2023    AST 30 04/03/2023    ALT 30 04/03/2023    [id number] 490 12/20/2021    HGBA1C 5.4 12/29/2022    LDLCALC 104 (H) 01/29/2024    CHOL 172 01/29/2024    HDL 58.3 01/29/2024    TRIG 49 01/29/2024    TSH 2.17 12/29/2022        EEG 05/16/2025    Impression  IMPRESSION    Impression    This awake EEG is normal. No epileptiform discharges or lateralizing signs are seen.  A full report will be scanned into the patient's chart at a later time.    This report has been interpreted and electronically signed by      Assessment/Plan     Dementia, by history, with behavioral disturbance  Bifrontal encephalomalacia on head imaging--? Post-traumatic vs other  Hx alcohol abuse  Tobacco use disorder  Pt had been seen by memory clinic in the past and carried this diagnosis. Alcohol likely has a role in this.  Previously discussed, Wernicke encephalopathy is not something I can ascertain at this time that may have happened in the past, but given clinical situation in the past it is a POSSIBILITY--no way to ascertain  In addition to dementia symptoms, it appears he has some impulsive behavior, which MAY be related to, or due to, bifrontal encephalomalacia.   I previously advised brother to discuss this with his psychiatry practitioner as he may benefit from being medicated for this. This is beyond the scope of my practice.  S/p donepezil  ? Trial of rivastigmine patch in past--? What happened, ? If  "tried  S/p memantine--says had HA/dizzy/diff swallowing  Discussed with pt/brother today, HA/dizziness are plausible SE, I am not aware why it would cause difficulty swallowing  Discussed, I don't have other medications to try for him     5. Seizure, symptomatic  6. Hyponatremia  7. Psychogenic polydipsia  EEG normal  #5 likely due to #6, which in turn was likely due to #7, in setting of chronic bifrontal ?? Traumatic (vs non-traumatic) encephalomalacia (which probably is a predisposition for possible sz for him)  Previously discussed, symptomatic seizure related to hyponatremia is not treated chronically with anti-seizure medication  Previously discussed, seizure related to alcohol use, or poss alcohol withdrawal, is not treated chronically with anti-seizure medication as well    8. Caregiver fatigue  Brother asking for \"order for inpatient OT for short term nursing home stay\", because brother needs a break from taking care of him  I discussed with brother I am not aware of such a pathway, and have not heard of that. I am happy to write him an order as he requests, but I do not know how or where that goes. Brother states he had called (or will call?) nursing home  Discussed, outside of scenario after acute medical hospitalization, psychiatric admission, or possible hospice, I am not aware of a way to get patient in to a nursing home for the indication brother is vocalizing  Discussed, sounds like he is describing getting respite care  I suggested to brother to discuss with PCP, who may have insight as to what he is asking for and trying to achieve, but brother tells me he already did and PCP apparently told him to \"discuss with neurologist\"  I suggested to brother as well to discuss his issues with psychiatrist       Plans:  OT order written per brother request  2.   Follow-up with psychiatry  3.   ? Respite care    Rtc as needed in gen neuro clinic    All questions were answered.  Pt knows how to contact my office " in case pt has any questions or concerns.    Bj Mancia MD              [1]   Patient Active Problem List  Diagnosis    Abnormal thyroid function test    Anemia    Anxiety    Cigarette smoker    Coronary artery stenosis    Elevated WBC count    Elevated liver enzymes    Edema    Diastolic dysfunction    Dementia associated with alcoholism with behavioral disturbance    Hyperlipidemia    Hypertension    Hyponatremia    IFG (impaired fasting glucose)    Night muscle spasms    Other microscopic hematuria    Rapid weight loss    Recurrent major depressive disorder, in partial remission    Vitamin D deficiency    Difficulty in walking, not elsewhere classified    H/O ETOH abuse    Muscle weakness    Other symbolic dysfunctions    Pure hypercholesterolemia    S/P CABG (coronary artery bypass graft)    Urinary frequency    Psychogenic polydipsia    Routine general medical examination at a health care facility    Encounter for screening for malignant neoplasm of colon    Screening for prostate cancer    Great toe pain, left    Centrilobular emphysema (Multi)    Pulmonary nodules    Seizure (Multi)    Generalized anxiety disorder   [2]   Allergies  Allergen Reactions    Haldol [Haloperidol] Other     Becomes violent   [3]   Current Outpatient Medications:     aspirin 81 mg EC tablet, Take 1 tablet (81 mg) by mouth once daily., Disp: , Rfl:     atorvastatin (Lipitor) 40 mg tablet, Take 1.5 tablets (60 mg) by mouth once every day., Disp: 90 tablet, Rfl: 0    clonazePAM (KlonoPIN) 0.5 mg tablet, Take 1 tablet (0.5 mg) by mouth 2 times a day as needed for anxiety., Disp: , Rfl:     escitalopram (Lexapro) 10 mg tablet, 1 tablet (10 mg)., Disp: , Rfl:     metoprolol tartrate (Lopressor) 25 mg tablet, Take 1 tablet (25 mg) by mouth 2 times a day., Disp: 180 tablet, Rfl: 0    thiamine (Vitamin B-1) 50 mg tablet, Take 1 tablet (50 mg) by mouth once daily., Disp: , Rfl:     memantine (Namenda) 5 mg tablet, Take 1 tablet (5 mg) by  mouth once daily. (Patient not taking: Reported on 7/2/2025), Disp: 30 tablet, Rfl: 0    Current Facility-Administered Medications:     ciclopirox (Penlac) 8 % solution, , Topical, Once, Luis Downey DPM

## 2025-07-03 PROBLEM — G93.89 ENCEPHALOMALACIA ON IMAGING STUDY: Status: ACTIVE | Noted: 2025-07-03

## 2025-07-08 ENCOUNTER — TELEPHONE (OUTPATIENT)
Dept: NEUROLOGY | Facility: HOSPITAL | Age: 65
End: 2025-07-08
Payer: MEDICARE

## 2025-07-08 NOTE — TELEPHONE ENCOUNTER
I received VMM from [facility] representative [facility] about this pt needing an authorization for OT at a [facility].  Pt and brother had contacted [facility].    I LVMM for Gina in [facility] about this.      I also called pt's brother (Giorgi) and updated him.  Verbalized understanding and satisfaction.

## 2025-07-10 ENCOUNTER — TELEPHONE (OUTPATIENT)
Dept: NEUROLOGY | Facility: HOSPITAL | Age: 65
End: 2025-07-10
Payer: MEDICARE

## 2025-07-10 NOTE — TELEPHONE ENCOUNTER
Updated that [facility] doesn't do PA for rehab, rehab does their own.  Told that rehab manager has been made aware that this needs to be done.    Rehab manager Jerrica Casiano was notofied by Gina in prec[facility] and also by me per email.    Verbalized understanding and satisfaction.

## 2025-07-23 ENCOUNTER — APPOINTMENT (OUTPATIENT)
Dept: NEUROLOGY | Facility: HOSPITAL | Age: 65
End: 2025-07-23
Payer: MEDICARE

## 2025-09-03 ENCOUNTER — HOSPITAL ENCOUNTER (OUTPATIENT)
Dept: LAB | Age: 65
Discharge: HOME | End: 2025-09-03
Payer: MEDICARE

## 2025-09-03 LAB
ALBUMIN SERPL-MCNC: 4.5 G/DL (ref 3.5–5.2)
ALP SERPL-CCNC: 79 U/L (ref 40–129)
ALT SERPL-CCNC: 32 U/L (ref 0–50)
ANION GAP SERPL CALCULATED.3IONS-SCNC: 12 MMOL/L (ref 7–16)
AST SERPL-CCNC: 27 U/L (ref 0–50)
BASOPHILS # BLD: 0.07 K/UL (ref 0–0.2)
BASOPHILS NFR BLD: 1 % (ref 0–2)
BILIRUB SERPL-MCNC: 0.5 MG/DL (ref 0–1.2)
BUN SERPL-MCNC: 17 MG/DL (ref 8–23)
CALCIUM SERPL-MCNC: 9.9 MG/DL (ref 8.8–10.2)
CHLORIDE SERPL-SCNC: 104 MMOL/L (ref 98–107)
CHOLEST SERPL-MCNC: 131 MG/DL
CO2 SERPL-SCNC: 23 MMOL/L (ref 22–29)
CREAT SERPL-MCNC: 1.3 MG/DL (ref 0.7–1.2)
EOSINOPHIL # BLD: 0.35 K/UL (ref 0.05–0.5)
EOSINOPHILS RELATIVE PERCENT: 5 % (ref 0–6)
ERYTHROCYTE [DISTWIDTH] IN BLOOD BY AUTOMATED COUNT: 13.3 % (ref 11.5–15)
GFR, ESTIMATED: 60 ML/MIN/1.73M2
GLUCOSE SERPL-MCNC: 109 MG/DL (ref 74–99)
HCT VFR BLD AUTO: 49.4 % (ref 37–54)
HDLC SERPL-MCNC: 37 MG/DL
HGB BLD-MCNC: 16.2 G/DL (ref 12.5–16.5)
IMM GRANULOCYTES # BLD AUTO: 0.03 K/UL (ref 0–0.58)
IMM GRANULOCYTES NFR BLD: 0 % (ref 0–5)
LDLC SERPL CALC-MCNC: 74 MG/DL
LYMPHOCYTES NFR BLD: 2.42 K/UL (ref 1.5–4)
LYMPHOCYTES RELATIVE PERCENT: 32 % (ref 20–42)
MCH RBC QN AUTO: 30.8 PG (ref 26–35)
MCHC RBC AUTO-ENTMCNC: 32.8 G/DL (ref 32–34.5)
MCV RBC AUTO: 93.9 FL (ref 80–99.9)
MONOCYTES NFR BLD: 0.47 K/UL (ref 0.1–0.95)
MONOCYTES NFR BLD: 6 % (ref 2–12)
NEUTROPHILS NFR BLD: 55 % (ref 43–80)
NEUTS SEG NFR BLD: 4.13 K/UL (ref 1.8–7.3)
PLATELET # BLD AUTO: 246 K/UL (ref 130–450)
PMV BLD AUTO: 9.1 FL (ref 7–12)
POTASSIUM SERPL-SCNC: 4.5 MMOL/L (ref 3.5–5.1)
PROT SERPL-MCNC: 7.3 G/DL (ref 6.4–8.3)
PSA SERPL-MCNC: 1.38 NG/ML (ref 0–4)
RBC # BLD AUTO: 5.26 M/UL (ref 3.8–5.8)
SODIUM SERPL-SCNC: 139 MMOL/L (ref 136–145)
TRIGL SERPL-MCNC: 102 MG/DL
VLDLC SERPL CALC-MCNC: 20 MG/DL
WBC OTHER # BLD: 7.5 K/UL (ref 4.5–11.5)

## 2025-09-03 PROCEDURE — 85025 COMPLETE CBC W/AUTO DIFF WBC: CPT

## 2025-09-03 PROCEDURE — 80061 LIPID PANEL: CPT

## 2025-09-03 PROCEDURE — G0103 PSA SCREENING: HCPCS

## 2025-09-03 PROCEDURE — 36415 COLL VENOUS BLD VENIPUNCTURE: CPT

## 2025-09-03 PROCEDURE — 80053 COMPREHEN METABOLIC PANEL: CPT
